# Patient Record
Sex: FEMALE | Race: WHITE | NOT HISPANIC OR LATINO | Employment: OTHER | URBAN - METROPOLITAN AREA
[De-identification: names, ages, dates, MRNs, and addresses within clinical notes are randomized per-mention and may not be internally consistent; named-entity substitution may affect disease eponyms.]

---

## 2019-07-27 ENCOUNTER — HOSPITAL ENCOUNTER (INPATIENT)
Facility: HOSPITAL | Age: 69
LOS: 5 days | Discharge: HOME HEALTH | DRG: 853 | End: 2019-08-01
Attending: EMERGENCY MEDICINE | Admitting: EMERGENCY MEDICINE
Payer: MEDICARE

## 2019-07-27 DIAGNOSIS — R00.0 TACHYCARDIA: ICD-10-CM

## 2019-07-27 DIAGNOSIS — N20.0 NEPHROLITHIASIS: ICD-10-CM

## 2019-07-27 DIAGNOSIS — A41.9 SEVERE SEPSIS WITH SEPTIC SHOCK: ICD-10-CM

## 2019-07-27 DIAGNOSIS — R51.9 NONINTRACTABLE HEADACHE, UNSPECIFIED CHRONICITY PATTERN, UNSPECIFIED HEADACHE TYPE: ICD-10-CM

## 2019-07-27 DIAGNOSIS — D70.8 OTHER NEUTROPENIA: ICD-10-CM

## 2019-07-27 DIAGNOSIS — I25.10 CAD (CORONARY ARTERY DISEASE): ICD-10-CM

## 2019-07-27 DIAGNOSIS — I21.4 NSTEMI (NON-ST ELEVATED MYOCARDIAL INFARCTION): ICD-10-CM

## 2019-07-27 DIAGNOSIS — Z45.2 ENCOUNTER FOR CENTRAL LINE PLACEMENT: ICD-10-CM

## 2019-07-27 DIAGNOSIS — R65.21 SEVERE SEPSIS WITH SEPTIC SHOCK: ICD-10-CM

## 2019-07-27 DIAGNOSIS — A41.9 SEPSIS, DUE TO UNSPECIFIED ORGANISM: Primary | ICD-10-CM

## 2019-07-27 DIAGNOSIS — R50.9 FEVER, UNSPECIFIED FEVER CAUSE: ICD-10-CM

## 2019-07-27 DIAGNOSIS — R07.9 CHEST PAIN: ICD-10-CM

## 2019-07-27 DIAGNOSIS — R79.89 ELEVATED TROPONIN: ICD-10-CM

## 2019-07-27 LAB
ALBUMIN SERPL BCP-MCNC: 3.4 G/DL (ref 3.5–5.2)
ALP SERPL-CCNC: 73 U/L (ref 55–135)
ALT SERPL W/O P-5'-P-CCNC: 25 U/L (ref 10–44)
AMPHET+METHAMPHET UR QL: NEGATIVE
ANION GAP SERPL CALC-SCNC: 14 MMOL/L (ref 8–16)
AST SERPL-CCNC: 20 U/L (ref 10–40)
BARBITURATES UR QL SCN>200 NG/ML: NEGATIVE
BASOPHILS NFR BLD: 0 % (ref 0–1.9)
BENZODIAZ UR QL SCN>200 NG/ML: NEGATIVE
BILIRUB SERPL-MCNC: 1.1 MG/DL (ref 0.1–1)
BILIRUB UR QL STRIP: NEGATIVE
BUN SERPL-MCNC: 27 MG/DL (ref 8–23)
BZE UR QL SCN: NEGATIVE
CALCIUM SERPL-MCNC: 9.6 MG/DL (ref 8.7–10.5)
CANNABINOIDS UR QL SCN: NEGATIVE
CHLORIDE SERPL-SCNC: 104 MMOL/L (ref 95–110)
CLARITY UR: CLEAR
CO2 SERPL-SCNC: 20 MMOL/L (ref 23–29)
COLOR UR: YELLOW
CREAT SERPL-MCNC: 1.8 MG/DL (ref 0.5–1.4)
CREAT UR-MCNC: 64.1 MG/DL (ref 15–325)
CTP QC/QA: YES
DIFFERENTIAL METHOD: ABNORMAL
EOSINOPHIL NFR BLD: 0 % (ref 0–8)
ERYTHROCYTE [DISTWIDTH] IN BLOOD BY AUTOMATED COUNT: 14.1 % (ref 11.5–14.5)
EST. GFR  (AFRICAN AMERICAN): 33 ML/MIN/1.73 M^2
EST. GFR  (NON AFRICAN AMERICAN): 29 ML/MIN/1.73 M^2
ETHANOL SERPL-MCNC: <10 MG/DL
FLUAV AG NPH QL: NEGATIVE
FLUBV AG NPH QL: NEGATIVE
GLUCOSE SERPL-MCNC: 118 MG/DL (ref 70–110)
GLUCOSE SERPL-MCNC: 165 MG/DL (ref 70–110)
GLUCOSE SERPL-MCNC: 177 MG/DL (ref 70–110)
GLUCOSE UR QL STRIP: ABNORMAL
HCT VFR BLD AUTO: 35.7 % (ref 37–48.5)
HGB BLD-MCNC: 11.3 G/DL (ref 12–16)
HGB UR QL STRIP: NEGATIVE
KETONES UR QL STRIP: NEGATIVE
LACTATE SERPL-SCNC: 4.3 MMOL/L (ref 0.5–2.2)
LACTATE SERPL-SCNC: 4.7 MMOL/L (ref 0.5–2.2)
LEUKOCYTE ESTERASE UR QL STRIP: NEGATIVE
LYMPHOCYTES NFR BLD: 40 % (ref 18–48)
MCH RBC QN AUTO: 29.7 PG (ref 27–31)
MCHC RBC AUTO-ENTMCNC: 31.7 G/DL (ref 32–36)
MCV RBC AUTO: 94 FL (ref 82–98)
METAMYELOCYTES NFR BLD MANUAL: 4 %
METHADONE UR QL SCN>300 NG/ML: NEGATIVE
MONOCYTES NFR BLD: 4 % (ref 4–15)
NEUTROPHILS NFR BLD: 44 % (ref 38–73)
NEUTS BAND NFR BLD MANUAL: 8 %
NITRITE UR QL STRIP: NEGATIVE
OPIATES UR QL SCN: NEGATIVE
PCP UR QL SCN>25 NG/ML: NEGATIVE
PH UR STRIP: 5 [PH] (ref 5–8)
PLATELET # BLD AUTO: 127 K/UL (ref 150–350)
PMV BLD AUTO: 9.8 FL (ref 9.2–12.9)
POTASSIUM SERPL-SCNC: 3.7 MMOL/L (ref 3.5–5.1)
PROT SERPL-MCNC: 6.3 G/DL (ref 6–8.4)
PROT UR QL STRIP: NEGATIVE
RBC # BLD AUTO: 3.8 M/UL (ref 4–5.4)
SODIUM SERPL-SCNC: 138 MMOL/L (ref 136–145)
SP GR UR STRIP: 1.01 (ref 1–1.03)
TOXICOLOGY INFORMATION: NORMAL
URN SPEC COLLECT METH UR: ABNORMAL
UROBILINOGEN UR STRIP-ACNC: NEGATIVE EU/DL
WBC # BLD AUTO: 1.07 K/UL (ref 3.9–12.7)

## 2019-07-27 PROCEDURE — 82962 GLUCOSE BLOOD TEST: CPT

## 2019-07-27 PROCEDURE — 80307 DRUG TEST PRSMV CHEM ANLYZR: CPT

## 2019-07-27 PROCEDURE — 96375 TX/PRO/DX INJ NEW DRUG ADDON: CPT

## 2019-07-27 PROCEDURE — 80053 COMPREHEN METABOLIC PANEL: CPT

## 2019-07-27 PROCEDURE — 93005 ELECTROCARDIOGRAM TRACING: CPT

## 2019-07-27 PROCEDURE — 85007 BL SMEAR W/DIFF WBC COUNT: CPT

## 2019-07-27 PROCEDURE — 84300 ASSAY OF URINE SODIUM: CPT

## 2019-07-27 PROCEDURE — 96367 TX/PROPH/DG ADDL SEQ IV INF: CPT

## 2019-07-27 PROCEDURE — 25000003 PHARM REV CODE 250: Performed by: EMERGENCY MEDICINE

## 2019-07-27 PROCEDURE — 85027 COMPLETE CBC AUTOMATED: CPT

## 2019-07-27 PROCEDURE — 82652 VIT D 1 25-DIHYDROXY: CPT

## 2019-07-27 PROCEDURE — 84484 ASSAY OF TROPONIN QUANT: CPT

## 2019-07-27 PROCEDURE — 81003 URINALYSIS AUTO W/O SCOPE: CPT | Mod: 59

## 2019-07-27 PROCEDURE — 87077 CULTURE AEROBIC IDENTIFY: CPT

## 2019-07-27 PROCEDURE — 93010 EKG 12-LEAD: ICD-10-PCS | Mod: ,,, | Performed by: INTERNAL MEDICINE

## 2019-07-27 PROCEDURE — 87186 SC STD MICRODIL/AGAR DIL: CPT

## 2019-07-27 PROCEDURE — 63600175 PHARM REV CODE 636 W HCPCS: Performed by: EMERGENCY MEDICINE

## 2019-07-27 PROCEDURE — 96361 HYDRATE IV INFUSION ADD-ON: CPT

## 2019-07-27 PROCEDURE — 93010 ELECTROCARDIOGRAM REPORT: CPT | Mod: ,,, | Performed by: INTERNAL MEDICINE

## 2019-07-27 PROCEDURE — 12000002 HC ACUTE/MED SURGE SEMI-PRIVATE ROOM

## 2019-07-27 PROCEDURE — 96365 THER/PROPH/DIAG IV INF INIT: CPT

## 2019-07-27 PROCEDURE — 80320 DRUG SCREEN QUANTALCOHOLS: CPT

## 2019-07-27 PROCEDURE — 36556 INSERT NON-TUNNEL CV CATH: CPT

## 2019-07-27 PROCEDURE — 83605 ASSAY OF LACTIC ACID: CPT | Mod: 91

## 2019-07-27 PROCEDURE — 87040 BLOOD CULTURE FOR BACTERIA: CPT

## 2019-07-27 PROCEDURE — 96366 THER/PROPH/DIAG IV INF ADDON: CPT

## 2019-07-27 PROCEDURE — 87804 INFLUENZA ASSAY W/OPTIC: CPT | Mod: 59

## 2019-07-27 PROCEDURE — 83970 ASSAY OF PARATHORMONE: CPT

## 2019-07-27 PROCEDURE — 99291 CRITICAL CARE FIRST HOUR: CPT | Mod: 25

## 2019-07-27 RX ORDER — NOREPINEPHRINE BITARTRATE/D5W 4MG/250ML
0.05 PLASTIC BAG, INJECTION (ML) INTRAVENOUS CONTINUOUS
Status: DISCONTINUED | OUTPATIENT
Start: 2019-07-27 | End: 2019-07-28

## 2019-07-27 RX ORDER — LIDOCAINE HYDROCHLORIDE 10 MG/ML
5 INJECTION INFILTRATION; PERINEURAL
Status: DISCONTINUED | OUTPATIENT
Start: 2019-07-27 | End: 2019-07-28

## 2019-07-27 RX ORDER — ACETAMINOPHEN 500 MG
1000 TABLET ORAL
Status: COMPLETED | OUTPATIENT
Start: 2019-07-27 | End: 2019-07-27

## 2019-07-27 RX ORDER — MORPHINE SULFATE 10 MG/ML
2 INJECTION INTRAMUSCULAR; INTRAVENOUS; SUBCUTANEOUS
Status: COMPLETED | OUTPATIENT
Start: 2019-07-27 | End: 2019-07-27

## 2019-07-27 RX ORDER — ONDANSETRON 2 MG/ML
4 INJECTION INTRAMUSCULAR; INTRAVENOUS
Status: COMPLETED | OUTPATIENT
Start: 2019-07-27 | End: 2019-07-27

## 2019-07-27 RX ADMIN — PIPERACILLIN AND TAZOBACTAM 4.5 G: 4; .5 INJECTION, POWDER, LYOPHILIZED, FOR SOLUTION INTRAVENOUS; PARENTERAL at 07:07

## 2019-07-27 RX ADMIN — VANCOMYCIN HYDROCHLORIDE 2000 MG: 100 INJECTION, POWDER, LYOPHILIZED, FOR SOLUTION INTRAVENOUS at 07:07

## 2019-07-27 RX ADMIN — SODIUM CHLORIDE 1000 ML: 0.9 INJECTION, SOLUTION INTRAVENOUS at 09:07

## 2019-07-27 RX ADMIN — SODIUM CHLORIDE 1500 ML: 0.9 INJECTION, SOLUTION INTRAVENOUS at 07:07

## 2019-07-27 RX ADMIN — ONDANSETRON 4 MG: 2 INJECTION INTRAMUSCULAR; INTRAVENOUS at 08:07

## 2019-07-27 RX ADMIN — AMPICILLIN SODIUM 2 G: 2 INJECTION, POWDER, FOR SOLUTION INTRAMUSCULAR; INTRAVENOUS at 11:07

## 2019-07-27 RX ADMIN — Medication 0.05 MCG/KG/MIN: at 09:07

## 2019-07-27 RX ADMIN — ACETAMINOPHEN 1000 MG: 500 TABLET ORAL at 07:07

## 2019-07-27 RX ADMIN — MORPHINE SULFATE 2 MG: 10 INJECTION INTRAVENOUS at 10:07

## 2019-07-27 NOTE — Clinical Note
127 ml injected throughout the case. 39 mL total wasted during the case. 166 mL total used in the case.

## 2019-07-27 NOTE — Clinical Note
Catheter is inserted into the ostium   right coronary artery. Angiography performed of the right coronary arteries in multiple views. Angiography performed via power injection with .

## 2019-07-27 NOTE — Clinical Note
Catheter is repositioned to the and insertion attempt made into the ostium   right coronary artery.

## 2019-07-28 ENCOUNTER — ANESTHESIA (OUTPATIENT)
Dept: SURGERY | Facility: HOSPITAL | Age: 69
DRG: 853 | End: 2019-07-28
Payer: MEDICARE

## 2019-07-28 ENCOUNTER — ANESTHESIA EVENT (OUTPATIENT)
Dept: SURGERY | Facility: HOSPITAL | Age: 69
DRG: 853 | End: 2019-07-28
Payer: MEDICARE

## 2019-07-28 PROBLEM — I10 ESSENTIAL HYPERTENSION: Chronic | Status: ACTIVE | Noted: 2019-07-28

## 2019-07-28 PROBLEM — D63.8 ANEMIA OF CHRONIC DISEASE: Chronic | Status: ACTIVE | Noted: 2019-07-28

## 2019-07-28 PROBLEM — R79.89 ELEVATED TROPONIN: Status: ACTIVE | Noted: 2019-07-28

## 2019-07-28 PROBLEM — E87.20 METABOLIC ACIDOSIS: Status: ACTIVE | Noted: 2019-07-28

## 2019-07-28 PROBLEM — N17.9 ACUTE RENAL FAILURE: Status: ACTIVE | Noted: 2019-07-28

## 2019-07-28 PROBLEM — R65.21 SEVERE SEPSIS WITH SEPTIC SHOCK: Status: ACTIVE | Noted: 2019-07-27

## 2019-07-28 PROBLEM — N11.1 OBSTRUCTIVE PYELONEPHRITIS: Status: ACTIVE | Noted: 2019-07-28

## 2019-07-28 PROBLEM — I25.10 CORONARY ARTERY DISEASE: Chronic | Status: ACTIVE | Noted: 2019-07-28

## 2019-07-28 PROBLEM — J96.90 RESPIRATORY FAILURE: Status: ACTIVE | Noted: 2019-07-28

## 2019-07-28 PROBLEM — D63.8 ANEMIA OF CHRONIC DISEASE: Status: ACTIVE | Noted: 2019-07-28

## 2019-07-28 PROBLEM — E11.9 TYPE 2 DIABETES MELLITUS: Chronic | Status: ACTIVE | Noted: 2019-07-28

## 2019-07-28 PROBLEM — I10 ESSENTIAL HYPERTENSION: Status: ACTIVE | Noted: 2019-07-28

## 2019-07-28 PROBLEM — I25.10 CORONARY ARTERY DISEASE: Status: ACTIVE | Noted: 2019-07-28

## 2019-07-28 PROBLEM — E11.9 TYPE 2 DIABETES MELLITUS: Status: ACTIVE | Noted: 2019-07-28

## 2019-07-28 LAB
ALBUMIN SERPL BCP-MCNC: 2.7 G/DL (ref 3.5–5.2)
ALBUMIN SERPL BCP-MCNC: 2.9 G/DL (ref 3.5–5.2)
ALLENS TEST: ABNORMAL
ALLENS TEST: ABNORMAL
ALP SERPL-CCNC: 43 U/L (ref 55–135)
ALP SERPL-CCNC: 50 U/L (ref 55–135)
ALT SERPL W/O P-5'-P-CCNC: 18 U/L (ref 10–44)
ALT SERPL W/O P-5'-P-CCNC: 24 U/L (ref 10–44)
AMORPH CRY URNS QL MICRO: ABNORMAL
ANION GAP SERPL CALC-SCNC: 17 MMOL/L (ref 8–16)
ANION GAP SERPL CALC-SCNC: 9 MMOL/L (ref 8–16)
AORTIC ROOT ANNULUS: 2.39 CM
AORTIC VALVE CUSP SEPERATION: 1.34 CM
ASCENDING AORTA: 2.46 CM
AST SERPL-CCNC: 24 U/L (ref 10–40)
AST SERPL-CCNC: 32 U/L (ref 10–40)
AV INDEX (PROSTH): 0.49
AV MEAN GRADIENT: 7 MMHG
AV PEAK GRADIENT: 12 MMHG
AV VALVE AREA: 1.52 CM2
AV VELOCITY RATIO: 0.51
BACTERIA #/AREA URNS HPF: ABNORMAL /HPF
BASOPHILS # BLD AUTO: 0.02 K/UL (ref 0–0.2)
BASOPHILS NFR BLD: 0 % (ref 0–1.9)
BASOPHILS NFR BLD: 0.1 % (ref 0–1.9)
BILIRUB SERPL-MCNC: 0.7 MG/DL (ref 0.1–1)
BILIRUB SERPL-MCNC: 0.8 MG/DL (ref 0.1–1)
BILIRUB UR QL STRIP: NEGATIVE
BSA FOR ECHO PROCEDURE: 1.9 M2
BUN SERPL-MCNC: 29 MG/DL (ref 8–23)
BUN SERPL-MCNC: 30 MG/DL (ref 8–23)
CALCIUM SERPL-MCNC: 8.2 MG/DL (ref 8.7–10.5)
CALCIUM SERPL-MCNC: 8.2 MG/DL (ref 8.7–10.5)
CHLORIDE SERPL-SCNC: 105 MMOL/L (ref 95–110)
CHLORIDE SERPL-SCNC: 107 MMOL/L (ref 95–110)
CLARITY UR: ABNORMAL
CO2 SERPL-SCNC: 13 MMOL/L (ref 23–29)
CO2 SERPL-SCNC: 21 MMOL/L (ref 23–29)
COLOR UR: ABNORMAL
CREAT SERPL-MCNC: 2.2 MG/DL (ref 0.5–1.4)
CREAT SERPL-MCNC: 2.4 MG/DL (ref 0.5–1.4)
CV ECHO LV RWT: 0.49 CM
DELSYS: ABNORMAL
DELSYS: ABNORMAL
DIFFERENTIAL METHOD: ABNORMAL
DIFFERENTIAL METHOD: ABNORMAL
DOP CALC AO PEAK VEL: 1.71 M/S
DOP CALC AO VTI: 36.37 CM
DOP CALC LVOT AREA: 3.1 CM2
DOP CALC LVOT DIAMETER: 2 CM
DOP CALC LVOT PEAK VEL: 0.87 M/S
DOP CALC LVOT STROKE VOLUME: 55.39 CM3
DOP CALCLVOT PEAK VEL VTI: 17.64 CM
E WAVE DECELERATION TIME: 194.99 MSEC
E/A RATIO: 1.37
E/E' RATIO: 13.18 M/S
ECHO LV POSTERIOR WALL: 0.95 CM (ref 0.6–1.1)
EOSINOPHIL # BLD AUTO: 0 K/UL (ref 0–0.5)
EOSINOPHIL NFR BLD: 0 % (ref 0–8)
EOSINOPHIL NFR BLD: 0 % (ref 0–8)
ERYTHROCYTE [DISTWIDTH] IN BLOOD BY AUTOMATED COUNT: 14.3 % (ref 11.5–14.5)
ERYTHROCYTE [DISTWIDTH] IN BLOOD BY AUTOMATED COUNT: 14.5 % (ref 11.5–14.5)
ERYTHROCYTE [SEDIMENTATION RATE] IN BLOOD BY WESTERGREN METHOD: 12 MM/H
ERYTHROCYTE [SEDIMENTATION RATE] IN BLOOD BY WESTERGREN METHOD: 20 MM/H
EST. GFR  (AFRICAN AMERICAN): 23 ML/MIN/1.73 M^2
EST. GFR  (AFRICAN AMERICAN): 26 ML/MIN/1.73 M^2
EST. GFR  (NON AFRICAN AMERICAN): 20 ML/MIN/1.73 M^2
EST. GFR  (NON AFRICAN AMERICAN): 22 ML/MIN/1.73 M^2
ESTIMATED AVG GLUCOSE: 194 MG/DL (ref 68–131)
FIO2: 50
FIO2: 60
FRACTIONAL SHORTENING: 31 % (ref 28–44)
GLUCOSE SERPL-MCNC: 226 MG/DL (ref 70–110)
GLUCOSE SERPL-MCNC: 261 MG/DL (ref 70–110)
GLUCOSE UR QL STRIP: ABNORMAL
HBA1C MFR BLD HPLC: 8.4 % (ref 4–5.6)
HCO3 UR-SCNC: 14.5 MMOL/L (ref 24–28)
HCO3 UR-SCNC: 16.3 MMOL/L (ref 24–28)
HCT VFR BLD AUTO: 30.5 % (ref 37–48.5)
HCT VFR BLD AUTO: 32.4 % (ref 37–48.5)
HGB BLD-MCNC: 10.1 G/DL (ref 12–16)
HGB BLD-MCNC: 9.6 G/DL (ref 12–16)
HGB UR QL STRIP: ABNORMAL
HYALINE CASTS #/AREA URNS LPF: 0 /LPF
INTERVENTRICULAR SEPTUM: 0.92 CM (ref 0.6–1.1)
IVRT: 0.07 MSEC
KETONES UR QL STRIP: NEGATIVE
LA MAJOR: 4.78 CM
LA MINOR: 4.75 CM
LA WIDTH: 3.28 CM
LACTATE SERPL-SCNC: 4.3 MMOL/L (ref 0.5–2.2)
LEFT ATRIUM SIZE: 3.56 CM
LEFT ATRIUM VOLUME INDEX: 25.9 ML/M2
LEFT ATRIUM VOLUME: 47.29 CM3
LEFT INTERNAL DIMENSION IN SYSTOLE: 2.7 CM (ref 2.1–4)
LEFT VENTRICLE DIASTOLIC VOLUME INDEX: 36.2 ML/M2
LEFT VENTRICLE DIASTOLIC VOLUME: 66.09 ML
LEFT VENTRICLE MASS INDEX: 61 G/M2
LEFT VENTRICLE SYSTOLIC VOLUME INDEX: 14.8 ML/M2
LEFT VENTRICLE SYSTOLIC VOLUME: 27 ML
LEFT VENTRICULAR INTERNAL DIMENSION IN DIASTOLE: 3.9 CM (ref 3.5–6)
LEFT VENTRICULAR MASS: 111.07 G
LEUKOCYTE ESTERASE UR QL STRIP: ABNORMAL
LV LATERAL E/E' RATIO: 11.2 M/S
LV SEPTAL E/E' RATIO: 16 M/S
LYMPHOCYTES # BLD AUTO: 1.6 K/UL (ref 1–4.8)
LYMPHOCYTES NFR BLD: 7.2 % (ref 18–48)
LYMPHOCYTES NFR BLD: 9 % (ref 18–48)
MAGNESIUM SERPL-MCNC: 0.9 MG/DL (ref 1.6–2.6)
MCH RBC QN AUTO: 29.7 PG (ref 27–31)
MCH RBC QN AUTO: 29.8 PG (ref 27–31)
MCHC RBC AUTO-ENTMCNC: 31.2 G/DL (ref 32–36)
MCHC RBC AUTO-ENTMCNC: 31.5 G/DL (ref 32–36)
MCV RBC AUTO: 94 FL (ref 82–98)
MCV RBC AUTO: 96 FL (ref 82–98)
METAMYELOCYTES NFR BLD MANUAL: 10 %
MICROSCOPIC COMMENT: ABNORMAL
MODE: ABNORMAL
MODE: ABNORMAL
MONOCYTES # BLD AUTO: 1.2 K/UL (ref 0.3–1)
MONOCYTES NFR BLD: 1 % (ref 4–15)
MONOCYTES NFR BLD: 5.6 % (ref 4–15)
MV PEAK A VEL: 0.82 M/S
MV PEAK E VEL: 1.12 M/S
MYELOCYTES NFR BLD MANUAL: 4 %
NEUTROPHILS # BLD AUTO: 19 K/UL (ref 1.8–7.7)
NEUTROPHILS NFR BLD: 54 % (ref 38–73)
NEUTROPHILS NFR BLD: 89.2 % (ref 38–73)
NEUTS BAND NFR BLD MANUAL: 22 %
NITRITE UR QL STRIP: NEGATIVE
PCO2 BLDA: 32.7 MMHG (ref 35–45)
PCO2 BLDA: 54.9 MMHG (ref 35–45)
PEEP: 5
PEEP: 5
PH SMN: 7.08 [PH] (ref 7.35–7.45)
PH SMN: 7.25 [PH] (ref 7.35–7.45)
PH UR STRIP: 7 [PH] (ref 5–8)
PHOSPHATE SERPL-MCNC: 2.4 MG/DL (ref 2.7–4.5)
PISA TR MAX VEL: 1.49 M/S
PLATELET # BLD AUTO: 139 K/UL (ref 150–350)
PLATELET # BLD AUTO: 140 K/UL (ref 150–350)
PMV BLD AUTO: 10.5 FL (ref 9.2–12.9)
PMV BLD AUTO: 11.3 FL (ref 9.2–12.9)
PO2 BLDA: 109 MMHG (ref 80–100)
PO2 BLDA: 110 MMHG (ref 80–100)
POC BE: -12 MMOL/L
POC BE: -13 MMOL/L
POC SATURATED O2: 96 % (ref 95–100)
POC SATURATED O2: 97 % (ref 95–100)
POC TCO2: 15 MMOL/L (ref 23–27)
POC TCO2: 18 MMOL/L (ref 23–27)
POCT GLUCOSE: 118 MG/DL (ref 70–110)
POCT GLUCOSE: 177 MG/DL (ref 70–110)
POCT GLUCOSE: 215 MG/DL (ref 70–110)
POCT GLUCOSE: 269 MG/DL (ref 70–110)
POCT GLUCOSE: 272 MG/DL (ref 70–110)
POCT GLUCOSE: 301 MG/DL (ref 70–110)
POTASSIUM SERPL-SCNC: 3.9 MMOL/L (ref 3.5–5.1)
POTASSIUM SERPL-SCNC: 4.3 MMOL/L (ref 3.5–5.1)
PROT SERPL-MCNC: 5.2 G/DL (ref 6–8.4)
PROT SERPL-MCNC: 6.2 G/DL (ref 6–8.4)
PROT UR QL STRIP: ABNORMAL
PULM VEIN S/D RATIO: 1.46
PV PEAK D VEL: 0.35 M/S
PV PEAK S VEL: 0.51 M/S
PV PEAK VELOCITY: 0.71 CM/S
RA MAJOR: 3.87 CM
RA PRESSURE: 3 MMHG
RA WIDTH: 3.64 CM
RBC # BLD AUTO: 3.23 M/UL (ref 4–5.4)
RBC # BLD AUTO: 3.39 M/UL (ref 4–5.4)
RBC #/AREA URNS HPF: >100 /HPF (ref 0–4)
RIGHT VENTRICULAR END-DIASTOLIC DIMENSION: 2.87 CM
RV TISSUE DOPPLER FREE WALL SYSTOLIC VELOCITY 1 (APICAL 4 CHAMBER VIEW): 7.91 CM/S
SAMPLE: ABNORMAL
SAMPLE: ABNORMAL
SINUS: 2.53 CM
SITE: ABNORMAL
SITE: ABNORMAL
SODIUM SERPL-SCNC: 135 MMOL/L (ref 136–145)
SODIUM SERPL-SCNC: 137 MMOL/L (ref 136–145)
SODIUM UR-SCNC: 114 MMOL/L (ref 20–250)
SP GR UR STRIP: 1.02 (ref 1–1.03)
SP02: 100
STJ: 2.3 CM
TDI LATERAL: 0.1 M/S
TDI SEPTAL: 0.07 M/S
TDI: 0.09 M/S
TR MAX PG: 9 MMHG
TRICUSPID ANNULAR PLANE SYSTOLIC EXCURSION: 2.35 CM
TROPONIN I SERPL DL<=0.01 NG/ML-MCNC: 0.08 NG/ML (ref 0–0.03)
TROPONIN I SERPL DL<=0.01 NG/ML-MCNC: 0.74 NG/ML (ref 0–0.03)
TROPONIN I SERPL DL<=0.01 NG/ML-MCNC: 1.03 NG/ML (ref 0–0.03)
TROPONIN I SERPL DL<=0.01 NG/ML-MCNC: 1.09 NG/ML (ref 0–0.03)
TV REST PULMONARY ARTERY PRESSURE: 12 MMHG
URN SPEC COLLECT METH UR: ABNORMAL
UROBILINOGEN UR STRIP-ACNC: NEGATIVE EU/DL
VT: 430
VT: 450
WBC # BLD AUTO: 12.09 K/UL (ref 3.9–12.7)
WBC # BLD AUTO: 21.83 K/UL (ref 3.9–12.7)
WBC #/AREA URNS HPF: 45 /HPF (ref 0–5)

## 2019-07-28 PROCEDURE — 83605 ASSAY OF LACTIC ACID: CPT

## 2019-07-28 PROCEDURE — 36620 PR INSERT CATH,ART,PERCUT,SHORTTERM: ICD-10-PCS | Mod: 59,,, | Performed by: ANESTHESIOLOGY

## 2019-07-28 PROCEDURE — 37000009 HC ANESTHESIA EA ADD 15 MINS: Performed by: UROLOGY

## 2019-07-28 PROCEDURE — 99223 1ST HOSP IP/OBS HIGH 75: CPT | Mod: ,,, | Performed by: UROLOGY

## 2019-07-28 PROCEDURE — 80048 BASIC METABOLIC PNL TOTAL CA: CPT

## 2019-07-28 PROCEDURE — D9220A PRA ANESTHESIA: Mod: CRNA,,, | Performed by: REGISTERED NURSE

## 2019-07-28 PROCEDURE — 25000003 PHARM REV CODE 250: Performed by: INTERNAL MEDICINE

## 2019-07-28 PROCEDURE — 76000 FLUOROSCOPY <1 HR PHYS/QHP: CPT | Mod: 26,59,, | Performed by: UROLOGY

## 2019-07-28 PROCEDURE — C1758 CATHETER, URETERAL: HCPCS | Performed by: UROLOGY

## 2019-07-28 PROCEDURE — 63600175 PHARM REV CODE 636 W HCPCS: Performed by: INTERNAL MEDICINE

## 2019-07-28 PROCEDURE — 82803 BLOOD GASES ANY COMBINATION: CPT

## 2019-07-28 PROCEDURE — 36415 COLL VENOUS BLD VENIPUNCTURE: CPT

## 2019-07-28 PROCEDURE — D9220A PRA ANESTHESIA: ICD-10-PCS | Mod: ANES,,, | Performed by: ANESTHESIOLOGY

## 2019-07-28 PROCEDURE — 63600175 PHARM REV CODE 636 W HCPCS: Performed by: HOSPITALIST

## 2019-07-28 PROCEDURE — 85027 COMPLETE CBC AUTOMATED: CPT

## 2019-07-28 PROCEDURE — 27000221 HC OXYGEN, UP TO 24 HOURS

## 2019-07-28 PROCEDURE — 94002 VENT MGMT INPAT INIT DAY: CPT

## 2019-07-28 PROCEDURE — 80053 COMPREHEN METABOLIC PANEL: CPT | Mod: 91

## 2019-07-28 PROCEDURE — 99291 CRITICAL CARE FIRST HOUR: CPT | Mod: ,,, | Performed by: INTERNAL MEDICINE

## 2019-07-28 PROCEDURE — 99900026 HC AIRWAY MAINTENANCE (STAT)

## 2019-07-28 PROCEDURE — D9220A PRA ANESTHESIA: ICD-10-PCS | Mod: CRNA,,, | Performed by: REGISTERED NURSE

## 2019-07-28 PROCEDURE — 83036 HEMOGLOBIN GLYCOSYLATED A1C: CPT

## 2019-07-28 PROCEDURE — S0028 INJECTION, FAMOTIDINE, 20 MG: HCPCS | Performed by: HOSPITALIST

## 2019-07-28 PROCEDURE — 63600175 PHARM REV CODE 636 W HCPCS: Performed by: REGISTERED NURSE

## 2019-07-28 PROCEDURE — 99291 CRITICAL CARE FIRST HOUR: CPT | Mod: 25,,, | Performed by: INTERNAL MEDICINE

## 2019-07-28 PROCEDURE — 36000707: Performed by: UROLOGY

## 2019-07-28 PROCEDURE — D9220A PRA ANESTHESIA: Mod: ANES,,, | Performed by: ANESTHESIOLOGY

## 2019-07-28 PROCEDURE — 37799 UNLISTED PX VASCULAR SURGERY: CPT

## 2019-07-28 PROCEDURE — 87077 CULTURE AEROBIC IDENTIFY: CPT

## 2019-07-28 PROCEDURE — 25000003 PHARM REV CODE 250: Performed by: HOSPITALIST

## 2019-07-28 PROCEDURE — 83735 ASSAY OF MAGNESIUM: CPT

## 2019-07-28 PROCEDURE — 63600175 PHARM REV CODE 636 W HCPCS

## 2019-07-28 PROCEDURE — 25000003 PHARM REV CODE 250: Performed by: REGISTERED NURSE

## 2019-07-28 PROCEDURE — 87086 URINE CULTURE/COLONY COUNT: CPT

## 2019-07-28 PROCEDURE — 52332 PR CYSTOSCOPY,INSERT URETERAL STENT: ICD-10-PCS | Mod: RT,,, | Performed by: UROLOGY

## 2019-07-28 PROCEDURE — 87088 URINE BACTERIA CULTURE: CPT

## 2019-07-28 PROCEDURE — 36000706: Performed by: UROLOGY

## 2019-07-28 PROCEDURE — 63600175 PHARM REV CODE 636 W HCPCS: Performed by: UROLOGY

## 2019-07-28 PROCEDURE — 85007 BL SMEAR W/DIFF WBC COUNT: CPT

## 2019-07-28 PROCEDURE — 52332 CYSTOSCOPY AND TREATMENT: CPT | Mod: RT,,, | Performed by: UROLOGY

## 2019-07-28 PROCEDURE — 84484 ASSAY OF TROPONIN QUANT: CPT | Mod: 91

## 2019-07-28 PROCEDURE — 99223 PR INITIAL HOSPITAL CARE,LEVL III: ICD-10-PCS | Mod: ,,, | Performed by: UROLOGY

## 2019-07-28 PROCEDURE — 94761 N-INVAS EAR/PLS OXIMETRY MLT: CPT

## 2019-07-28 PROCEDURE — 87186 SC STD MICRODIL/AGAR DIL: CPT

## 2019-07-28 PROCEDURE — 84100 ASSAY OF PHOSPHORUS: CPT

## 2019-07-28 PROCEDURE — 99900035 HC TECH TIME PER 15 MIN (STAT)

## 2019-07-28 PROCEDURE — 63600175 PHARM REV CODE 636 W HCPCS: Performed by: EMERGENCY MEDICINE

## 2019-07-28 PROCEDURE — 80053 COMPREHEN METABOLIC PANEL: CPT

## 2019-07-28 PROCEDURE — 81000 URINALYSIS NONAUTO W/SCOPE: CPT

## 2019-07-28 PROCEDURE — 85025 COMPLETE CBC W/AUTO DIFF WBC: CPT

## 2019-07-28 PROCEDURE — 27200966 HC CLOSED SUCTION SYSTEM

## 2019-07-28 PROCEDURE — 99291 PR CRITICAL CARE, E/M 30-74 MINUTES: ICD-10-PCS | Mod: 25,,, | Performed by: INTERNAL MEDICINE

## 2019-07-28 PROCEDURE — 99291 PR CRITICAL CARE, E/M 30-74 MINUTES: ICD-10-PCS | Mod: ,,, | Performed by: INTERNAL MEDICINE

## 2019-07-28 PROCEDURE — C2617 STENT, NON-COR, TEM W/O DEL: HCPCS | Performed by: UROLOGY

## 2019-07-28 PROCEDURE — C1769 GUIDE WIRE: HCPCS | Performed by: UROLOGY

## 2019-07-28 PROCEDURE — 36620 INSERTION CATHETER ARTERY: CPT | Mod: 59,,, | Performed by: ANESTHESIOLOGY

## 2019-07-28 PROCEDURE — 37000008 HC ANESTHESIA 1ST 15 MINUTES: Performed by: UROLOGY

## 2019-07-28 PROCEDURE — 20000000 HC ICU ROOM

## 2019-07-28 PROCEDURE — 76000 PR  FLUOROSCOPE EXAMINATION: ICD-10-PCS | Mod: 26,59,, | Performed by: UROLOGY

## 2019-07-28 DEVICE — STENT URET PERCUFLEX 6FR 26CM: Type: IMPLANTABLE DEVICE | Site: URETER | Status: FUNCTIONAL

## 2019-07-28 RX ORDER — SUCCINYLCHOLINE CHLORIDE 20 MG/ML
INJECTION INTRAMUSCULAR; INTRAVENOUS
Status: DISCONTINUED | OUTPATIENT
Start: 2019-07-28 | End: 2019-07-28

## 2019-07-28 RX ORDER — SODIUM,POTASSIUM PHOSPHATES 280-250MG
1 POWDER IN PACKET (EA) ORAL
Status: DISCONTINUED | OUTPATIENT
Start: 2019-07-28 | End: 2019-07-28

## 2019-07-28 RX ORDER — FAMOTIDINE 10 MG/ML
20 INJECTION INTRAVENOUS DAILY
Status: DISCONTINUED | OUTPATIENT
Start: 2019-07-28 | End: 2019-08-01 | Stop reason: HOSPADM

## 2019-07-28 RX ORDER — ACETAMINOPHEN 325 MG/1
650 TABLET ORAL EVERY 8 HOURS PRN
Status: DISCONTINUED | OUTPATIENT
Start: 2019-07-28 | End: 2019-07-28

## 2019-07-28 RX ORDER — METOPROLOL TARTRATE 1 MG/ML
INJECTION, SOLUTION INTRAVENOUS
Status: DISCONTINUED | OUTPATIENT
Start: 2019-07-28 | End: 2019-07-28

## 2019-07-28 RX ORDER — DEXMEDETOMIDINE HYDROCHLORIDE 4 UG/ML
0.2 INJECTION INTRAVENOUS CONTINUOUS
Status: DISCONTINUED | OUTPATIENT
Start: 2019-07-28 | End: 2019-07-28

## 2019-07-28 RX ORDER — SODIUM CHLORIDE 0.9 % (FLUSH) 0.9 %
10 SYRINGE (ML) INJECTION
Status: DISCONTINUED | OUTPATIENT
Start: 2019-07-28 | End: 2019-07-28

## 2019-07-28 RX ORDER — PROPOFOL 10 MG/ML
INJECTION, EMULSION INTRAVENOUS
Status: COMPLETED
Start: 2019-07-28 | End: 2019-07-28

## 2019-07-28 RX ORDER — INSULIN ASPART 100 [IU]/ML
0-5 INJECTION, SOLUTION INTRAVENOUS; SUBCUTANEOUS
Status: DISCONTINUED | OUTPATIENT
Start: 2019-07-28 | End: 2019-07-29

## 2019-07-28 RX ORDER — INSULIN ASPART 100 [IU]/ML
3 INJECTION, SOLUTION INTRAVENOUS; SUBCUTANEOUS
Status: DISCONTINUED | OUTPATIENT
Start: 2019-07-28 | End: 2019-07-28

## 2019-07-28 RX ORDER — PROPOFOL 10 MG/ML
5 INJECTION, EMULSION INTRAVENOUS CONTINUOUS
Status: DISCONTINUED | OUTPATIENT
Start: 2019-07-28 | End: 2019-07-29

## 2019-07-28 RX ORDER — ENOXAPARIN SODIUM 100 MG/ML
30 INJECTION SUBCUTANEOUS EVERY 24 HOURS
Status: DISCONTINUED | OUTPATIENT
Start: 2019-07-28 | End: 2019-07-28

## 2019-07-28 RX ORDER — IBUPROFEN 200 MG
24 TABLET ORAL
Status: DISCONTINUED | OUTPATIENT
Start: 2019-07-28 | End: 2019-08-01 | Stop reason: HOSPADM

## 2019-07-28 RX ORDER — ROCURONIUM BROMIDE 10 MG/ML
INJECTION, SOLUTION INTRAVENOUS
Status: DISCONTINUED | OUTPATIENT
Start: 2019-07-28 | End: 2019-07-28

## 2019-07-28 RX ORDER — IBUPROFEN 200 MG
16 TABLET ORAL
Status: DISCONTINUED | OUTPATIENT
Start: 2019-07-28 | End: 2019-08-01 | Stop reason: HOSPADM

## 2019-07-28 RX ORDER — PROPOFOL 10 MG/ML
VIAL (ML) INTRAVENOUS
Status: DISCONTINUED | OUTPATIENT
Start: 2019-07-28 | End: 2019-07-28

## 2019-07-28 RX ORDER — GLUCAGON 1 MG
1 KIT INJECTION
Status: DISCONTINUED | OUTPATIENT
Start: 2019-07-28 | End: 2019-08-01 | Stop reason: HOSPADM

## 2019-07-28 RX ORDER — CHLORHEXIDINE GLUCONATE ORAL RINSE 1.2 MG/ML
15 SOLUTION DENTAL 2 TIMES DAILY
Status: DISCONTINUED | OUTPATIENT
Start: 2019-07-28 | End: 2019-07-29

## 2019-07-28 RX ORDER — ONDANSETRON 2 MG/ML
4 INJECTION INTRAMUSCULAR; INTRAVENOUS EVERY 8 HOURS PRN
Status: DISCONTINUED | OUTPATIENT
Start: 2019-07-28 | End: 2019-07-28

## 2019-07-28 RX ORDER — MORPHINE SULFATE 10 MG/ML
2 INJECTION INTRAMUSCULAR; INTRAVENOUS; SUBCUTANEOUS EVERY 4 HOURS PRN
Status: DISCONTINUED | OUTPATIENT
Start: 2019-07-28 | End: 2019-07-28

## 2019-07-28 RX ORDER — AMOXICILLIN 250 MG
1 CAPSULE ORAL 2 TIMES DAILY PRN
Status: DISCONTINUED | OUTPATIENT
Start: 2019-07-28 | End: 2019-08-01 | Stop reason: HOSPADM

## 2019-07-28 RX ORDER — SODIUM CHLORIDE 9 MG/ML
INJECTION, SOLUTION INTRAVENOUS CONTINUOUS
Status: DISCONTINUED | OUTPATIENT
Start: 2019-07-28 | End: 2019-07-28

## 2019-07-28 RX ORDER — FENTANYL CITRATE 50 UG/ML
INJECTION, SOLUTION INTRAMUSCULAR; INTRAVENOUS
Status: DISCONTINUED | OUTPATIENT
Start: 2019-07-28 | End: 2019-07-28

## 2019-07-28 RX ORDER — ENOXAPARIN SODIUM 100 MG/ML
30 INJECTION SUBCUTANEOUS EVERY 24 HOURS
Status: DISCONTINUED | OUTPATIENT
Start: 2019-07-29 | End: 2019-07-29

## 2019-07-28 RX ORDER — ONDANSETRON 2 MG/ML
8 INJECTION INTRAMUSCULAR; INTRAVENOUS EVERY 8 HOURS PRN
Status: DISCONTINUED | OUTPATIENT
Start: 2019-07-28 | End: 2019-08-01 | Stop reason: HOSPADM

## 2019-07-28 RX ORDER — LIDOCAINE HCL/PF 100 MG/5ML
SYRINGE (ML) INTRAVENOUS
Status: DISCONTINUED | OUTPATIENT
Start: 2019-07-28 | End: 2019-07-28

## 2019-07-28 RX ORDER — ACETAMINOPHEN 500 MG
500 TABLET ORAL EVERY 6 HOURS PRN
Status: DISCONTINUED | OUTPATIENT
Start: 2019-07-28 | End: 2019-07-29

## 2019-07-28 RX ORDER — TRAMADOL HYDROCHLORIDE 50 MG/1
50 TABLET ORAL EVERY 8 HOURS PRN
Status: DISCONTINUED | OUTPATIENT
Start: 2019-07-28 | End: 2019-07-29

## 2019-07-28 RX ORDER — DEXMEDETOMIDINE HYDROCHLORIDE 4 UG/ML
0.2 INJECTION INTRAVENOUS CONTINUOUS
Status: DISCONTINUED | OUTPATIENT
Start: 2019-07-28 | End: 2019-07-30

## 2019-07-28 RX ORDER — FENTANYL CITRATE 50 UG/ML
12.5 INJECTION, SOLUTION INTRAMUSCULAR; INTRAVENOUS
Status: DISCONTINUED | OUTPATIENT
Start: 2019-07-28 | End: 2019-07-29

## 2019-07-28 RX ORDER — ETOMIDATE 2 MG/ML
INJECTION INTRAVENOUS
Status: DISCONTINUED | OUTPATIENT
Start: 2019-07-28 | End: 2019-07-28

## 2019-07-28 RX ORDER — NOREPINEPHRINE BITARTRATE/D5W 4MG/250ML
0.05 PLASTIC BAG, INJECTION (ML) INTRAVENOUS CONTINUOUS
Status: DISCONTINUED | OUTPATIENT
Start: 2019-07-28 | End: 2019-07-28

## 2019-07-28 RX ORDER — RAMELTEON 8 MG/1
8 TABLET ORAL NIGHTLY PRN
Status: DISCONTINUED | OUTPATIENT
Start: 2019-07-28 | End: 2019-08-01 | Stop reason: HOSPADM

## 2019-07-28 RX ORDER — PHENYLEPHRINE HYDROCHLORIDE 10 MG/ML
INJECTION INTRAVENOUS
Status: DISCONTINUED | OUTPATIENT
Start: 2019-07-28 | End: 2019-07-28

## 2019-07-28 RX ADMIN — ACETAMINOPHEN 500 MG: 500 TABLET, FILM COATED ORAL at 04:07

## 2019-07-28 RX ADMIN — INSULIN ASPART 3 UNITS: 100 INJECTION, SOLUTION INTRAVENOUS; SUBCUTANEOUS at 11:07

## 2019-07-28 RX ADMIN — SODIUM BICARBONATE: 84 INJECTION, SOLUTION INTRAVENOUS at 02:07

## 2019-07-28 RX ADMIN — PHENYLEPHRINE HYDROCHLORIDE 200 MCG: 10 INJECTION INTRAVENOUS at 11:07

## 2019-07-28 RX ADMIN — DEXMEDETOMIDINE HYDROCHLORIDE 1.2 MCG/KG/HR: 4 INJECTION INTRAVENOUS at 09:07

## 2019-07-28 RX ADMIN — PROPOFOL 5 MCG/KG/MIN: 10 INJECTION, EMULSION INTRAVENOUS at 11:07

## 2019-07-28 RX ADMIN — NOREPINEPHRINE BITARTRATE 0.25 MCG/KG/MIN: 1 INJECTION INTRAVENOUS at 08:07

## 2019-07-28 RX ADMIN — PIPERACILLIN AND TAZOBACTAM 4.5 G: 4; .5 INJECTION, POWDER, LYOPHILIZED, FOR SOLUTION INTRAVENOUS; PARENTERAL at 04:07

## 2019-07-28 RX ADMIN — PIPERACILLIN AND TAZOBACTAM 4.5 G: 4; .5 INJECTION, POWDER, LYOPHILIZED, FOR SOLUTION INTRAVENOUS; PARENTERAL at 11:07

## 2019-07-28 RX ADMIN — FAMOTIDINE 20 MG: 10 INJECTION INTRAVENOUS at 01:07

## 2019-07-28 RX ADMIN — FENTANYL CITRATE 12.5 MCG: 50 INJECTION, SOLUTION INTRAMUSCULAR; INTRAVENOUS at 06:07

## 2019-07-28 RX ADMIN — INSULIN ASPART 2 UNITS: 100 INJECTION, SOLUTION INTRAVENOUS; SUBCUTANEOUS at 04:07

## 2019-07-28 RX ADMIN — PROPOFOL 25 MCG/KG/MIN: 10 INJECTION, EMULSION INTRAVENOUS at 09:07

## 2019-07-28 RX ADMIN — PHENYLEPHRINE HYDROCHLORIDE 100 MCG: 10 INJECTION INTRAVENOUS at 10:07

## 2019-07-28 RX ADMIN — LIDOCAINE HYDROCHLORIDE 80 MG: 20 INJECTION, SOLUTION INTRAVENOUS at 10:07

## 2019-07-28 RX ADMIN — ROCURONIUM BROMIDE 5 MG: 10 INJECTION, SOLUTION INTRAVENOUS at 10:07

## 2019-07-28 RX ADMIN — CHLORHEXIDINE GLUCONATE 0.12% ORAL RINSE 15 ML: 1.2 LIQUID ORAL at 08:07

## 2019-07-28 RX ADMIN — FENTANYL CITRATE 50 MCG: 50 INJECTION INTRAMUSCULAR; INTRAVENOUS at 10:07

## 2019-07-28 RX ADMIN — SODIUM CHLORIDE: 0.9 INJECTION, SOLUTION INTRAVENOUS at 12:07

## 2019-07-28 RX ADMIN — DEXMEDETOMIDINE HYDROCHLORIDE 1.4 MCG/KG/HR: 4 INJECTION INTRAVENOUS at 05:07

## 2019-07-28 RX ADMIN — INSULIN ASPART 4 UNITS: 100 INJECTION, SOLUTION INTRAVENOUS; SUBCUTANEOUS at 07:07

## 2019-07-28 RX ADMIN — PROPOFOL 30 MG: 10 INJECTION, EMULSION INTRAVENOUS at 11:07

## 2019-07-28 RX ADMIN — DEXMEDETOMIDINE HYDROCHLORIDE 0.2 MCG/KG/HR: 4 INJECTION INTRAVENOUS at 02:07

## 2019-07-28 RX ADMIN — TRAMADOL HYDROCHLORIDE 50 MG: 50 TABLET, FILM COATED ORAL at 01:07

## 2019-07-28 RX ADMIN — MAGNESIUM SULFATE HEPTAHYDRATE 3 G: 500 INJECTION, SOLUTION INTRAMUSCULAR; INTRAVENOUS at 04:07

## 2019-07-28 RX ADMIN — INSULIN ASPART 3 UNITS: 100 INJECTION, SOLUTION INTRAVENOUS; SUBCUTANEOUS at 08:07

## 2019-07-28 RX ADMIN — Medication 0.15 MCG/KG/MIN: at 12:07

## 2019-07-28 RX ADMIN — SODIUM BICARBONATE: 84 INJECTION, SOLUTION INTRAVENOUS at 09:07

## 2019-07-28 RX ADMIN — PIPERACILLIN AND TAZOBACTAM 4.5 G: 4; .5 INJECTION, POWDER, LYOPHILIZED, FOR SOLUTION INTRAVENOUS; PARENTERAL at 08:07

## 2019-07-28 RX ADMIN — NOREPINEPHRINE BITARTRATE 0.3 MCG/KG/MIN: 1 INJECTION INTRAVENOUS at 01:07

## 2019-07-28 RX ADMIN — METOPROLOL TARTRATE 1 MG: 1 INJECTION, SOLUTION INTRAVENOUS at 10:07

## 2019-07-28 RX ADMIN — SUCCINYLCHOLINE CHLORIDE 160 MG: 20 INJECTION, SOLUTION INTRAMUSCULAR; INTRAVENOUS at 10:07

## 2019-07-28 RX ADMIN — ETOMIDATE 16 MG: 2 INJECTION, SOLUTION INTRAVENOUS at 10:07

## 2019-07-28 RX ADMIN — SODIUM CHLORIDE: 0.9 INJECTION, SOLUTION INTRAVENOUS at 07:07

## 2019-07-28 NOTE — CONSULTS
[facility] [place]  Urology  Consult Note    Patient Name: Dasia Abreu  MRN: [id number]  Admission Date: 7/27/2019  Hospital Length of Stay: 1   Code Status: Full Code   Attending Provider: Ministerio Crenshaw MD   Consulting Provider: CRISTHIAN Mendez MD  Primary Care Physician: Primary Doctor No  Principal Problem:Severe sepsis with septic shock    Inpatient consult to Urology  Consult performed by: PRETTY Mendez MD  Consult ordered by: Frank Hanna MD          Subjective:     HPI:  Urolithiasis  Patient complains of right abdominal pain with radiation to the abdomen. Onset of symptoms was abrupt starting 2 days ago with unchanged course since that time. Patient describes the pain as aching, intermittent and rated as moderate. The patient has had nausea and vomiting and diaphoresis. There has been fever and chills. The patient is not complaining of dysuria or frequency. Risk factors for urolithiasis: none.        Past Medical History:   Diagnosis Date    Diabetes mellitus     Diverticulitis     Hypertension     Thyroid disease        Past Surgical History:   Procedure Laterality Date    BACK SURGERY      BLADDER SUSPENSION      CATARACT EXTRACTION, BILATERAL      HYSTERECTOMY         Review of patient's allergies indicates:  No Known Allergies    Family History     None          Tobacco Use    Smoking status: Never Smoker   Substance and Sexual Activity    Alcohol use: Never     Frequency: Never    Drug use: Never    Sexual activity: Not on file       Review of Systems   Constitutional: Positive for chills and fever.   HENT: Negative.    Eyes: Negative.    Respiratory: Negative for cough, chest tightness and shortness of breath.    Cardiovascular: Negative for chest pain.   Gastrointestinal: Negative.  Negative for constipation, diarrhea and nausea.   Genitourinary: Positive for flank pain and frequency.   Neurological: Positive for headaches.   Psychiatric/Behavioral: Negative.         Objective:     Temp:  [98.2 °F (36.8 °C)-103.1 °F (39.5 °C)] 98.5 °F (36.9 °C)  Pulse:  [] 88  Resp:  [15-40] 20  SpO2:  [88 %-100 %] 99 %  BP: ()/(44-80) 116/59     Body mass index is 32.82 kg/m².    Date 07/28/19 0700 - 07/29/19 0659   Shift [id number] 9307-3018 24 Hour Total   INTAKE   I.V.(mL/kg) 603.7(7.7)   603.7(7.7)   Shift Total(mL/kg) 603.7(7.7)   603.7(7.7)   OUTPUT   Urine(mL/kg/hr) 130   130   Shift Total(mL/kg) 130(1.6)   130(1.6)   Weight (kg) 78.8 78.8 78.8 78.8          Drains     Drain                 Urethral Catheter 07/28/19 0100 Non-latex less than 1 day                Physical Exam   Nursing note and vitals reviewed.  Constitutional: She is oriented to person, place, and time. She appears well-developed.   HENT:   Head: Normocephalic.   Eyes: Conjunctivae are normal.   Neck: Normal range of motion. No tracheal deviation present. No thyromegaly present.   Cardiovascular: Normal rate, normal heart sounds and normal pulses.    Pulmonary/Chest: Effort normal and breath sounds normal. No respiratory distress. She has no wheezes.   Abdominal: Soft. She exhibits no distension and no mass. There is no hepatosplenomegaly. There is no tenderness. There is CVA tenderness. There is no rebound and no guarding. No hernia.   Musculoskeletal: Normal range of motion. She exhibits no edema or tenderness.   Lymphadenopathy:     She has no cervical adenopathy.   Neurological: She is alert and oriented to person, place, and time.   Skin: Skin is warm and dry. No rash noted. No erythema.     Psychiatric: She has a normal mood and affect. Her behavior is normal. Judgment and thought content normal.       Significant Labs:    BMP:  Recent Labs   Lab 07/27/19  1931 07/28/19  0310    135*   K 3.7 3.9    105   CO2 20* 21*   BUN 27* 29*   CREATININE 1.8* 2.4*   CALCIUM 9.6 8.2*       CBC:  Recent Labs   Lab 07/27/19  1931 07/28/19  0310   WBC 1.07* 12.09   HGB 11.3* 9.6*   HCT 35.7*  30.5*   * 139*       Blood Culture:   Recent Labs   Lab 07/27/19  1931 07/27/19  1935   LABBLOO Gram stain nilsa bottle:Gram negative rods  Gram stain aer bottle: Gram negative rods  Results called to and read back by:ICU BRENNEN ROGER   07/28/2019  07:37 Gram stain aer bottle: Gram negative rods  Gram stain nilsa bottle: Gram negative rods  Results called to and read back by: ICU BRENNEN ROGER 07/28/2019  07:40     Urine Culture: No results for input(s): LABURIN in the last 168 hours.    Significant Imaging:  CT: I have reviewed all results within the past 24 hours and my personal findings are:  Right hydronephrosis with stranding, right renal pelvic stone and right ureteral stone                    Assessment and Plan:     Obstructive pyelonephritis  NPO  Cystoscopy with urgent right ureteral stent placement  Consented and marked  Stay on scheduled antibiotics     Acute renal failure  Plan for stent  Continue IVF        VTE Risk Mitigation (From admission, onward)        Ordered     IP VTE LOW RISK PATIENT  Once      07/28/19 0018          Thank you for your consult. I will follow-up with patient. Please contact us if you have any additional questions.    CRISTHIAN Mendez MD  Urology  [facility]

## 2019-07-28 NOTE — ASSESSMENT & PLAN NOTE
Unfortunately we do not have previous lab work to which to compare to chronicity of her anemia, though she does not have any source of acute bleeding.  There is no indication for transfusion at this time.  Will continue to monitor.

## 2019-07-28 NOTE — HPI
Ms. Dasia Abreu is a 68 y.o. female from [place] with essential hypertension, type 2 diabetes mellitus (HbA1c unknown), CAD, and anemia chronic disease who presents to [facility] ED with complaints of diarrhea starting five days ago.  Her elderly mother lives with her in [place] and was driven into town five days ago to attend an appointment with her doctors.  Starting that they she started to watery stools that had resolved by the 2nd day.  Her friend recently bought a new car and she and her group of friends took the car to the [facility].  She started to feel very tired and lethargic and had chills once she arrived home.  She also complained of lower abdominal pain that started that day that was associated with urinary frequency; she denies any dysuria, hematuria, nor any urinary urgency.  Abdominal pain was nonradiating and was 8/10 in severity at its worst.  She also denies any chest pain, shortness of breath, palpitations, nor any diaphoresis.  She also reports right shoulder/right lower neck pain starting around the same time and started have a headache starting yesterday.  She denies any blurry vision.  She also denies any rashes nor joint pain. She has otherwise been in her usual state of health.      Currently on Levophed drip.  Underwent right ureteral stent today morning.  Troponin elevated at 0.7.  Last EKG shows sinus tachycardia with nonspecific ST changes.  No acute ST elevation changes noted.  Currently patient is intubated and all the history was obtained from the nurse as well as the patient chart.  The nurse states that the patient said that she had been complaining of worsening dyspnea on exertion.  The nurse also stated that the patient told her that she had undergone angiogram in the past and had a blockage, but nothing which required stents.  She also complained of right shoulder pain, denied chest pain.  In surgery she complained of stabbing back pain prior to intubation as per the  surgical staff.  She is visiting from [place] and apparently follows with a cardiologist in [place].

## 2019-07-28 NOTE — CONSULTS
[facility]  Pulmonology  Consult Note    Patient Name: Dasia Abreu  MRN: [id number]  Admission Date: 7/27/2019  Hospital Length of Stay: 1 days  Code Status: Full Code  Attending Physician: Ministerio Crenshaw MD  Primary Care Provider: Primary Doctor No   Principal Problem: Severe sepsis with septic shock    Inpatient consult to Pulmonology  Consult performed by: Torito Mcgee MD  Consult ordered by: Erasmo Garcia MD        Subjective:     HPI:  Patient is 68 y.o. female  has a past medical history of Diabetes mellitus, Diverticulitis, Hypertension, and Thyroid disease. presented to [facility] on 7/27/19 with diarrhea and abdominal pain.  + fever 103 and GNR from blood culture. CT abd with 4 mm right nephrolithiasis.   Patient was taken to OR this am for right ureteral stent.  Post op, patient was kept on vent and pulmonary was consulted for vent management.  Initial ABG with respiratory and metabolic acidosis.  Rate and tidal volume was adjusted was adjusted.      Currently on 40% fio2.  Levophed requirement is trending down.        Past Medical History:   Diagnosis Date    Diabetes mellitus     Diverticulitis     Hypertension     Thyroid disease        Past Surgical History:   Procedure Laterality Date    BACK SURGERY      BLADDER SUSPENSION      CATARACT EXTRACTION, BILATERAL      HYSTERECTOMY         Review of patient's allergies indicates:  No Known Allergies    Family History     None        Tobacco Use    Smoking status: Never Smoker   Substance and Sexual Activity    Alcohol use: Never     Frequency: Never    Drug use: Never    Sexual activity: Not on file         Review of Systems   Unable to perform ROS: Intubated     Objective:     Vital Signs (Most Recent):  Temp: 97.8 °F (36.6 °C) (07/28/19 1145)  Pulse: 85 (07/28/19 1513)  Resp: (!) 23 (07/28/19 1513)  BP: (!) 150/57 (07/28/19 1505)  SpO2: 100 % (07/28/19 1513) Vital Signs (24h Range):  Temp:  [97.7 °F (36.5  °C)-103.1 °F (39.5 °C)] 97.8 °F (36.6 °C)  Pulse:  [] 85  Resp:  [12-40] 23  SpO2:  [88 %-100 %] 100 %  BP: ()/() 150/57  Arterial Line BP: ()/(43-92) 155/68     Weight: 83.7 kg (184 lb 8.4 oz)  Body mass index is 34.87 kg/m².      Intake/Output Summary (Last 24 hours) at 7/28/2019 1627  Last data filed at 7/28/2019 1416  Gross per 24 hour   Intake 6431.11 ml   Output 785 ml   Net 5646.11 ml       Physical Exam   Constitutional: She appears well-developed.   HENT:   Head: Normocephalic and atraumatic.   Nose: Nose normal.   Mouth/Throat: Oropharynx is clear and moist.   Eyes: Pupils are equal, round, and reactive to light. EOM are normal.   Neck: Normal range of motion.   Cardiovascular: Normal rate, regular rhythm and normal heart sounds.   Pulmonary/Chest: Effort normal and breath sounds normal. No respiratory distress. She has no wheezes. She has no rales. She exhibits no tenderness.   Abdominal: Soft. Bowel sounds are normal. She exhibits no mass. There is no rebound and no guarding.   Genitourinary:   Genitourinary Comments: Sosa in place.  +bloody urine.     Musculoskeletal: She exhibits no edema or tenderness.   Neurological: She is alert. No cranial nerve deficit. Coordination normal.   Follows command.     Skin: No rash noted. No erythema. No pallor.   Psychiatric: She has a normal mood and affect. Her behavior is normal.       Vents:  Vent Mode: SIMV (PC) + PS (07/28/19 1513)  Ventilator Initiated: Yes (07/28/19 1137)  Set Rate: 22 bmp (07/28/19 1513)  Vt Set: 450 mL (07/28/19 1513)  PEEP/CPAP: 5 cmH20 (07/28/19 1513)  Oxygen Concentration (%): 40 (07/28/19 1513)  Peak Airway Pressure: 26.1 cmH2O (07/28/19 1513)  Total Ve: 10.2 mL (07/28/19 1513)  F/VT Ratio<105 (RSBI): (!) 49.68 (07/28/19 1513)    Lines/Drains/Airways     Central Venous Catheter Line                 Percutaneous Central Line Insertion/Assessment - triple lumen  07/27/19 2220 right internal jugular less than 1 day           Drain                 Urethral Catheter 07/28/19 0100 Non-latex less than 1 day          Airway                 Airway - Non-Surgical 07/28/19 1035 Endotracheal Tube less than 1 day          Arterial Line                 Arterial Line 07/28/19 1006 Right Radial less than 1 day          Peripheral Intravenous Line                 Peripheral IV - Single Lumen 07/27/19 20 G Right Upper Arm 1 day                Significant Labs:    CBC/Anemia Profile:  Recent Labs   Lab 07/27/19 1931 07/28/19 0310 07/28/19  1153   WBC 1.07* 12.09 21.83*   HGB 11.3* 9.6* 10.1*   HCT 35.7* 30.5* 32.4*   * 139* 140*   MCV 94 94 96   RDW 14.1 14.3 14.5        Chemistries:  Recent Labs   Lab 07/27/19 1931 07/28/19 0310 07/28/19  1153    135* 137   K 3.7 3.9 4.3    105 107   CO2 20* 21* 13*   BUN 27* 29* 30*   CREATININE 1.8* 2.4* 2.2*   CALCIUM 9.6 8.2* 8.2*   ALBUMIN 3.4* 2.7* 2.9*   PROT 6.3 5.2* 6.2   BILITOT 1.1* 0.8 0.7   ALKPHOS 73 43* 50*   ALT 25 18 24   AST 20 24 32   MG  --  0.9*  --    PHOS  --  2.4*  --        ABGs:   Recent Labs   Lab 07/28/19  1316   PH 7.254*   PCO2 32.7*   HCO3 14.5*   POCSATURATED 97   BE -12     Echo 7/28/19  · Normal left ventricular systolic function. The estimated ejection fraction is 65%  · Concentric left ventricular remodeling.  · Normal LV diastolic function.  · Normal right ventricular systolic function.  · Mild left atrial enlargement.  · Mild mitral regurgitation.  · Mild tricuspid regurgitation.  · Normal central venous pressure (3 mm Hg).  · The estimated PA systolic pressure is 12 mm     Significant Imaging:   CXR: I have reviewed all pertinent results/findings within the past 24 hours and my personal findings are:  7/28/19 bilateral reticular markings.  appear worse when compared to 7/27/19 cxr.      Assessment/Plan:     * Severe sepsis with septic shock  gnr from blood culture.  Most likely urosepsis.  S/p ureteral stenting.  Appear to have adequate source  control aeb lessening pressor requirement.  Currently with zosyn/vanco.  Should consider stopping vanco.      Metabolic acidosis  Anion gap 17.  Most likely from lactic acidosis and arf.  hco3 drip.  Repeat abg improving.      Respiratory failure  abg improve acidosis with improve ventilation.  Oxygenation improved despite worsening cxr.  Possible sbt in am pending acid base status.      Elevated troponin  Most likely demand ischemia.  Card is on boad.      Acute renal failure  Most likely obstructive.  Good urine output.  Will monitor.          Thank you for your consult. I will follow-up with patient. Please contact us if you have any additional questions.     Torito Mcgee MD  Pulmonology  [facility]  Critical Care Time: 45  minutes  Critical secondary to septic shock and respiratory failure     Critical care was time spent personally by me on the following activities: development of treatment plan with patient or surrogate and bedside caregivers, discussions with consultants, evaluation of patient's response to treatment, examination of patient, ordering and performing treatments and interventions, ordering and review of laboratory studies, ordering and review of radiographic studies, pulse oximetry, re-evaluation of patient's condition.  This critical care time did not overlap with that of any other provider or involve time for any procedures.

## 2019-07-28 NOTE — ASSESSMENT & PLAN NOTE
Review of her previous medical records reveal that she is on metformin and mixed insulin therapy; will provide basal-prandial insulin therapy along with insulin sliding scale.  HbA1c is pending.

## 2019-07-28 NOTE — ASSESSMENT & PLAN NOTE
abg improve acidosis with improve ventilation.  Oxygenation improved despite worsening cxr.  Possible sbt in am pending acid base status.

## 2019-07-28 NOTE — PROGRESS NOTES
eICU Brief Admission Note       Briefly, 68 y.o. female with essential hypertension, type 2 diabetes mellitus (HbA1c unknown), CAD, diverticulitis, thyroid disease and anemia chronic disease who presents to [facility] ED with complaints of fever and associated lower abdominal pain, posterior HA, myalgia, dysuria, cough, and generalized weakness beginning 3 days ago, worsening this morning. Per EMS she was found lethargic at home, with blood glucose of 178 mmHg, blood pressure of 130s/ 80s and was 92% on room air, being placed on 1 liter of oxygen by EMS.        Video assessment :    Vitals reviewed   Afebrile, stable vitals     LABs reviewed       Radiology reviewed   CT A/P   4 mm right proximal ureteral calculus with associated mild hydronephrosis and moderate perinephric infiltration.  4 mm nonobstructing pelvic calculus.  Hepatosplenomegaly.  Hepatic steatosis.  Distal descending colon and sigmoid colon diverticulosis without evidence of diverticulitis.    CT head   No acute intracranial process.  Additional evaluation, as clinically warranted.  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        Assessment / Plan :  # septic shock, lactic acidosis , likely pyelonephritis , obstructive UTI   # Neutropenia ,    # Anemia , Thrombocytopenia   # MINI/CKD   # DM   - ABX : s/p Vanco ; on Zosyn; f/u cultures   - on Levophed   - Urology consult to relive obstruction   - SSI, premeal Insulin, Detemir         DVT Px : SCD, will defer to hospitalist for drug therapy   GI Px : N/A      Patient seen over video : Yes  Chart reviewed : Yes  Spoke with RN : Yes

## 2019-07-28 NOTE — HPI
Patient is 68 y.o. female  has a past medical history of Diabetes mellitus, Diverticulitis, Hypertension, and Thyroid disease. presented to [facility] on 7/27/19 with diarrhea and abdominal pain.  + fever 103 and GNR from blood culture. CT abd with 4 mm right nephrolithiasis.   Patient was taken to OR this am for right ureteral stent.  Post op, patient was kept on vent and pulmonary was consulted for vent management.  Initial ABG with respiratory and metabolic acidosis.  Rate and tidal volume was adjusted was adjusted.      Currently on 40% fio2.  Levophed requirement is trending down.

## 2019-07-28 NOTE — OP NOTE
Date of Procedure: 07/28/2019     Preoperative Diagnosis:  Sepsis, right ureteral stone, obstructive uropathy    Postoperative Diagnosis:  Sepsis, right ureteral stone, obstructive uropathy    Primary Surgeon: PRETTY Mendez MD    Anesthesia:  General    Procedure Performed:  Cystoscopy, right ureteral stent placement, Sosa catheter, placement fluoroscopy    Estimated Blood Loss:  Minimal    Drains:  6 Gambian x 26 cm double-J stent, no string, 16 Gambian Sosa catheter    Specimens Removed:  None    Complications:  None    Indications: Dasia Abreu is a 68-year-old woman with sepsis.  Imaging showed a right ureteral stone with hydronephrosis.  Recommended that she undergo right ureteral stent placement    Procedure in Detail:    Dasia Abreu taken to the operating room where she was positively identified by alison.  She has placed supine the operating room table.  Following induction of adequate general anesthesia cc placed in the dorsal lithotomy position and her external genitalia were prepped and draped in usual sterile fashion.    The preoperative time-out was performed as well as confirmation of preoperative antibiotics and preoperative marking on the right side    A  film was taking using fluoroscopy.    A 22 Gambian rigid cystoscope was then passed per urethra into the bladder under direct vision.  The bladder was seen to be hyperemic consistent with urinary tract infection.    Was ureteral orifices were identified there seen to be in orthotopic position.    I then passed a 5 Gambian open-ended catheter into the bladder using the scope.  I then directed a hydrophilic tip Sensor wire through the open-ended catheter intubating the right ureteral orifice the wire was passed up to the level of the kidney.  There is mild resistance noted consistent with a proximal ureteral stone.  The open-ended catheter was withdrawn.    The right ureteral orifice was then seen to be draining purulence once the wire was  then placed.    I then passed a 6 Paraguayan by 26 cm double-J stent over the wire into the right kidney.  Coil was seen deployed within the right kidney confirmed on fluoroscopy and a coil was seen deployed within the bladder confirmed visually.  Purulence continue to drain.    The scope was withdrawn    A 16 Paraguayan Sosa catheter was then placed for postoperative drainage.    Her anesthesia was reversed he was then taken back to the ICU in stable condition.

## 2019-07-28 NOTE — ANESTHESIA POSTPROCEDURE EVALUATION
Anesthesia Post Evaluation    Patient: Dasia Abreu    Procedure(s) Performed: Procedure(s) (LRB):  CYSTOSCOPY, WITH URETERAL STENT INSERTION (Bilateral)    Final Anesthesia Type: general  Patient location during evaluation: ICU  Patient participation: No - Unable to Participate, Intubation  Level of consciousness: sedated  Post-procedure vital signs: reviewed and stable  Pain management: adequate  Airway patency: patent  PONV status at discharge: No PONV  Anesthetic complications: yes  Perioperative Events: other periop events (see comments)  Susan-operative Events Comments: Patient needed post op ventilation due to poor respiratory effort..in icu at this time..troponins elevated..cxr with increased perihilar pulmonary edema..echo shows hyperdynamic state  Cardiovascular status: blood pressure returned to baseline, hemodynamically stable and stable  Respiratory status: unassisted and spontaneous ventilation  Hydration status: euvolemic  Follow-up not needed.          Vitals Value Taken Time   /58 7/28/2019  6:17 PM   Temp 36.7 °C (98 °F) 7/28/2019  3:13 PM   Pulse 90 7/28/2019  6:21 PM   Resp 31 7/28/2019  6:21 PM   SpO2 99 % 7/28/2019  6:21 PM   Vitals shown include unvalidated device data.      No case tracking events are documented in the log.      Pain/Gloria Score: Pain Rating Prior to Med Admin: 0 (7/28/2019  6:06 PM)  Pain Rating Post Med Admin: 3 (7/28/2019  5:53 AM)

## 2019-07-28 NOTE — PROGRESS NOTES
Patient cv stable in or..at end of case patient with poor tidal volumes..decision for post op ventilation made. No other issues.

## 2019-07-28 NOTE — ANESTHESIA PREPROCEDURE EVALUATION
07/28/2019  Dasia Abreu is a 68 y.o., female.    Anesthesia Evaluation         Review of Systems  Anesthesia Hx:  No problems with previous Anesthesia   Cardiovascular:   Hypertension CAD  Dysrhythmias (controlled by metaprolol per patient)  troponins noted by Den who felt cardiac issues were secondary to sepsis from renal calculi..cardiology to follow  Patient noted a stess test 1 year ago negative   Renal/:   Chronic Renal Disease    Endocrine:   Diabetes        Physical Exam  General:  Well nourished, Morbid Obesity    Airway/Jaw/Neck:  Airway Findings: Mallampati: III TM Distance: < 4 cm      Dental:  DENTAL FINDINGS: Normal   Chest/Lungs:  Chest/Lungs Clear    Heart/Vascular:  Heart Findings: Normal       Mental Status:  Mental Status Findings:  Cooperative, Alert and Oriented         Anesthesia Plan  Type of Anesthesia, risks & benefits discussed:  Anesthesia Type:  general  Patient's Preference:   Intra-op Monitoring Plan: standard ASA monitors  Intra-op Monitoring Plan Comments:   Post Op Pain Control Plan: multimodal analgesia, IV/PO Opioids PRN and per primary service following discharge from PACU  Post Op Pain Control Plan Comments:   Induction:    Beta Blocker:  Patient is not currently on a Beta-Blocker (No further documentation required).       Informed Consent: Patient understands risks and agrees with Anesthesia plan.  Questions answered. Anesthesia consent signed with patient.  ASA Score: 3     Day of Surgery Review of History & Physical:    H&P update referred to the provider.  H&P completed by Anesthesiologist.   Anesthesia Plan Notes: General anesthesia  A line to closely watch pressures  Npo  Patient with diagnosis of sepsis on levophen which will be continued        Ready For Surgery From Anesthesia Perspective.

## 2019-07-28 NOTE — CARE UPDATE
Patient seen and examined.  Agree with Dr. Hanna's treatment and plan.  Septic shock secondary to obstructive uropathy from ureteral calculus.  Now also noted to have GNR bacteremia.  Stop Vanc and continue Zosyn.  Currently on Levophed.  Patient noted to have increase in Troponin.  Hx of CAD, but no chest pain.  Cardiology consulted.  Elevated Troponin probably related to demand from septic shock.  Her main issue right now is septic shock secondary to obstructive uropathy.  May eventually need ischemic work up, but would not delay urgent cystoscopy and stent placement right now.

## 2019-07-28 NOTE — ED NOTES
MD at bedside speaking to pt and family about central line placement. Consent signed per pt sister at this time.

## 2019-07-28 NOTE — SUBJECTIVE & OBJECTIVE
Past Medical History:   Diagnosis Date    Diabetes mellitus     Diverticulitis     Hypertension     Thyroid disease        Past Surgical History:   Procedure Laterality Date    BACK SURGERY      BLADDER SUSPENSION      CATARACT EXTRACTION, BILATERAL      HYSTERECTOMY         Review of patient's allergies indicates:  No Known Allergies    No current facility-administered medications on file prior to encounter.      No current outpatient medications on file prior to encounter.     Family History     None        Tobacco Use    Smoking status: Never Smoker   Substance and Sexual Activity    Alcohol use: Never     Frequency: Never    Drug use: Never    Sexual activity: Not on file     Review of Systems   Unable to perform ROS: intubated     Objective:     Vital Signs (Most Recent):  Temp: 98.5 °F (36.9 °C) (07/28/19 0815)  Pulse: 88 (07/28/19 1000)  Resp: (!) 21 (07/28/19 1000)  BP: (!) 128/56 (07/28/19 1000)  SpO2: 98 % (07/28/19 1000) Vital Signs (24h Range):  Temp:  [98.2 °F (36.8 °C)-103.1 °F (39.5 °C)] 98.5 °F (36.9 °C)  Pulse:  [] 88  Resp:  [15-40] 21  SpO2:  [88 %-100 %] 98 %  BP: ()/(44-80) 128/56     Weight: 83.7 kg (184 lb 8.4 oz)  Body mass index is 34.87 kg/m².    SpO2: 98 %  O2 Device (Oxygen Therapy): nasal cannula      Intake/Output Summary (Last 24 hours) at 7/28/2019 1125  Last data filed at 7/28/2019 1000  Gross per 24 hour   Intake 5029.59 ml   Output 370 ml   Net 4659.59 ml       Lines/Drains/Airways     Central Venous Catheter Line                 Percutaneous Central Line Insertion/Assessment - triple lumen  07/27/19 2220 right internal jugular less than 1 day          Drain                 Urethral Catheter 07/28/19 0100 Non-latex less than 1 day          Airway                 Airway - Non-Surgical 07/28/19 1035 Endotracheal Tube less than 1 day          Arterial Line                 Arterial Line 07/28/19 1006 Right Radial less than 1 day          Peripheral Intravenous  Line                 Peripheral IV - Single Lumen 07/27/19 20 G Right Upper Arm 1 day                Physical Exam   Constitutional: She is oriented to person, place, and time. She appears well-developed and well-nourished. She has a sickly appearance. She is intubated.   HENT:   Head: Normocephalic.   Eyes: Pupils are equal, round, and reactive to light.   Neck: Normal range of motion. Neck supple.   Cardiovascular: Regular rhythm. Tachycardia present.   Pulmonary/Chest: Effort normal and breath sounds normal. She is intubated.   Abdominal: Soft. Normal appearance and bowel sounds are normal. There is no tenderness.   Musculoskeletal: She exhibits no edema, tenderness or deformity.   Neurological: She is alert and oriented to person, place, and time.   Unable to perform as patient intubated   Skin: Skin is warm.   Psychiatric: She has a normal mood and affect.   Nursing note and vitals reviewed.      Significant Labs:   BMP:   Recent Labs   Lab 07/27/19 1931 07/28/19  0310   * 226*    135*   K 3.7 3.9    105   CO2 20* 21*   BUN 27* 29*   CREATININE 1.8* 2.4*   CALCIUM 9.6 8.2*   MG  --  0.9*   , CMP   Recent Labs   Lab 07/27/19 1931 07/28/19  0310    135*   K 3.7 3.9    105   CO2 20* 21*   * 226*   BUN 27* 29*   CREATININE 1.8* 2.4*   CALCIUM 9.6 8.2*   PROT 6.3 5.2*   ALBUMIN 3.4* 2.7*   BILITOT 1.1* 0.8   ALKPHOS 73 43*   AST 20 24   ALT 25 18   ANIONGAP 14 9   ESTGFRAFRICA 33* 23*   EGFRNONAA 29* 20*   , CBC   Recent Labs   Lab 07/27/19 1931 07/28/19  0310   WBC 1.07* 12.09   HGB 11.3* 9.6*   HCT 35.7* 30.5*   * 139*   , INR No results for input(s): INR, PROTIME in the last 48 hours., Lipid Panel No results for input(s): CHOL, HDL, LDLCALC, TRIG, CHOLHDL in the last 48 hours., Troponin   Recent Labs   Lab 07/27/19 1931 07/28/19  0310   TROPONINI 0.081* 0.739*    and All pertinent lab results from the last 24 hours have been reviewed.    Significant Imaging: EKG:  Personally reviewed as above

## 2019-07-28 NOTE — ASSESSMENT & PLAN NOTE
NPO  Cystoscopy with urgent right ureteral stent placement  Consented and marked  Stay on scheduled antibiotics

## 2019-07-28 NOTE — PROGRESS NOTES
Pharmacokinetic Initial Assessment: IV Vancomycin    Assessment/Plan:    Initiate intravenous vancomycin with loading dose of 2000 mg once followed by a maintenance dose of vancomycin 500 mg IV every 24 hours  Desired empiric serum trough concentration is 10 to 20 mcg/mL.  Draw vancomycin trough level 30 min prior to third dose on 7/29/19 at approximately 1930   Pharmacy will continue to follow and monitor vancomycin.      Please contact pharmacy at extension [phone] with any questions regarding this assessment.     Thank you for the consult,   Kaden Wallace     Patient brief summary:  Dasia Abreu is a 68 y.o. female initiated on antimicrobial therapy with IV Vancomycin for treatment of suspected neutropenic fever    Drug Allergies:   Review of patient's allergies indicates:  No Known Allergies    Actual Body Weight:   78.8 kg    Renal Function:   Estimated Creatinine Clearance: 28.4 mL/min (A) (based on SCr of 1.8 mg/dL (H)).,     Dialysis Method (if applicable):  None     CBC (last 72 hours):  Recent Labs   Lab Result Units 07/27/19 1931   WBC K/uL 1.07*   Hemoglobin g/dL 11.3*   Hematocrit % 35.7*   Platelets K/uL 127*   Gran% % 44.0   Lymph% % 40.0   Mono% % 4.0   Eosinophil% % 0.0   Basophil% % 0.0   Differential Method  Manual       Metabolic Panel (last 72 hours):  Recent Labs   Lab Result Units 07/27/19 1931 07/27/19 1942   Sodium mmol/L 138  --    Sodium Urine Random mmol/L  --  114   Potassium mmol/L 3.7  --    Chloride mmol/L 104  --    CO2 mmol/L 20*  --    Glucose mg/dL 165*  --    Glucose, UA   --  1+*   BUN, Bld mg/dL 27*  --    Creatinine mg/dL 1.8*  --    Creatinine, Random Ur mg/dL  --  64.1   Albumin g/dL 3.4*  --    Total Bilirubin mg/dL 1.1*  --    Alkaline Phosphatase U/L 73  --    AST U/L 20  --    ALT U/L 25  --        Drug levels (last 3 results):  No results for input(s): VANCOMYCINRA, VANCOMYCINPE, VANCOMYCINTR in the last 72 hours.    Microbiologic Results:  Microbiology Results  (last 7 days)       Procedure Component Value Units Date/Time    Blood culture x two cultures. Draw prior to antibiotics. [[id number]] Collected:  07/27/19 1935    Order Status:  Sent Specimen:  Blood from Peripheral, Hand, Right Updated:  07/1950    Blood culture x two cultures. Draw prior to antibiotics. [[id number]] Collected:  07/27/19 1931    Order Status:  Sent Specimen:  Blood from Peripheral, Antecubital, Right Updated:  07/27/19 1949

## 2019-07-28 NOTE — ASSESSMENT & PLAN NOTE
She was noted on cardiac CT-A to have nonobstructive coronary artery disease in September 2018 and has been following up with a cardiologist in [place].  Will encourage continued follow-up.

## 2019-07-28 NOTE — TRANSFER OF CARE
"Anesthesia Transfer of Care Note    Patient: Dasia Abreu    Procedure(s) Performed: Procedure(s) (LRB):  CYSTOSCOPY, WITH URETERAL STENT INSERTION (Bilateral)    Patient location: ICU    Anesthesia Type: general    Transport from OR: Transported from OR intubated on 100% O2 by AMBU with adequate controlled ventilation    Post pain: adequate analgesia    Post assessment: no apparent anesthetic complications and tolerated procedure well    Post vital signs: stable    Level of consciousness: responds to stimulation    Complications: none    Transfer of care protocol was followed      Last vitals:   Visit Vitals  /64   Pulse 105   Temp 36.5 °C (97.7 °F)   Resp 12   Ht 5' 1" (1.549 m)   Wt 83.7 kg (184 lb 8.4 oz)   SpO2 97%   Breastfeeding? No   BMI 34.87 kg/m²     "

## 2019-07-28 NOTE — ASSESSMENT & PLAN NOTE
As per chart, history of nonobstructive coronary artery disease.  Try to get records from patient's cardiologist

## 2019-07-28 NOTE — SUBJECTIVE & OBJECTIVE
Past Medical History:   Diagnosis Date    Diabetes mellitus     Diverticulitis     Hypertension     Thyroid disease        Past Surgical History:   Procedure Laterality Date    BACK SURGERY      BLADDER SUSPENSION      CATARACT EXTRACTION, BILATERAL      HYSTERECTOMY         Review of patient's allergies indicates:  No Known Allergies    No current facility-administered medications on file prior to encounter.      No current outpatient medications on file prior to encounter.     Family History     Noncontributory        Tobacco Use    Smoking status: Never Smoker   Substance and Sexual Activity    Alcohol use: Never     Frequency: Never    Drug use: Never    Sexual activity: Not on file     Review of Systems   Constitutional: Positive for activity change, appetite change and chills. Negative for diaphoresis, fatigue, fever and unexpected weight change.   HENT: Negative.    Eyes: Negative.    Respiratory: Negative for cough, chest tightness, shortness of breath and wheezing.    Cardiovascular: Negative for chest pain, palpitations and leg swelling.   Gastrointestinal: Positive for abdominal pain and diarrhea. Negative for abdominal distention, blood in stool, constipation, nausea and vomiting.   Genitourinary: Positive for frequency. Negative for dysuria, hematuria and urgency.   Musculoskeletal: Negative.    Skin: Negative.    Neurological: Positive for headaches. Negative for dizziness, seizures, syncope, weakness and light-headedness.   Psychiatric/Behavioral: Negative.      Objective:     Vital Signs (Most Recent):  Temp: 98.2 °F (36.8 °C) (07/28/19 0026)  Pulse: (!) 111 (07/28/19 0040)  Resp: (!) 24 (07/28/19 0040)  BP: (!) 88/52 (07/28/19 0030)  SpO2: 98 % (07/28/19 0040) Vital Signs (24h Range):  Temp:  [98.2 °F (36.8 °C)-103.1 °F (39.5 °C)] 98.2 °F (36.8 °C)  Pulse:  [110-155] 111  Resp:  [15-40] 24  SpO2:  [88 %-100 %] 98 %  BP: ()/(44-80) 88/52     Weight: 78.8 kg (173 lb 11.6 oz)  Body  mass index is 32.82 kg/m².    Physical Exam   Constitutional: She is oriented to person, place, and time. She appears well-developed and well-nourished. No distress.   HENT:   Head: Normocephalic and atraumatic.   Right Ear: External ear normal.   Left Ear: External ear normal.   Nose: Nose normal.   Right IJ central venous line in place; see she has tenderness to palpation over the right shoulder and right lower neck, no pain on flexion of the neck and no limitation of head rotation   Eyes: Right eye exhibits no discharge. Left eye exhibits no discharge.   Neck: Normal range of motion.   Cardiovascular:   Tachycardic but regular rhythm; no murmurs or gallops   Pulmonary/Chest: Effort normal. No stridor. No respiratory distress. She has no wheezes. She has no rales. She exhibits no tenderness.   Abdominal: Soft. Bowel sounds are normal. She exhibits no distension. There is no tenderness. There is no rebound and no guarding.   Musculoskeletal: Normal range of motion. She exhibits no edema.   Neurological: She is alert and oriented to person, place, and time.   Skin: Skin is warm and dry. She is not diaphoretic. No erythema.   Psychiatric: She has a normal mood and affect. Her behavior is normal. Judgment and thought content normal.   Nursing note and vitals reviewed.          Significant Labs: All pertinent labs within the past 24 hours have been reviewed.    Significant Imaging: I have reviewed and interpreted all pertinent imaging results/findings within the past 24 hours.

## 2019-07-28 NOTE — ASSESSMENT & PLAN NOTE
The patient presented with normal blood pressures but soon became hypotensive despite an adequate IV fluid challenge.  She was placed a central venous catheter and started on vasopressor support with norepinephrine.  She has also been febrile to 103.1° F and does also meet other sepsis criteria with tachycardia, tachypnea, leukopenia, and lactic acidosis.  A clear source has yet been identified as a urinalysis is normal.  I also reviewed her chest radiograph which was also benign.  Curiously, her CT-abd/pelvis was significant for 4 mm right proximal ureteral calculus with associated mild hydronephrosis and moderate perinephric infiltration.  4 mm nonobstructing pelvic calculus.  A lumbar puncture was attempted but she has hardware in her back (L4 L5 pin) which makes a safe attempt difficult; she has no meningismus.  She also is absent any rash, heart murmur, back pain, nor joint effusions.  She has been started on empiric antibiotic therapy with vancomycin, piperacillin/tazobactam, and ampicillin pending blood and urine cultures.    Of note, her absolute neutrophil count is 556 and in the setting of a fever, she will be placed in neutropenic precautions.    Will consult Interventional Radiology for lumbar puncture.

## 2019-07-28 NOTE — H&P
[facility] Hospital Medicine  History & Physical    Patient Name: Dasia Abreu  MRN: [id number]  Admission Date: 7/27/2019  Attending Physician: Ministerio Crenshaw MD   Primary Care Provider: Primary Doctor No         Patient information was obtained from patient.     Subjective:     Principal Problem:Severe sepsis with septic shock    Chief Complaint:  Diarrhea five days ago.    HPI: Ms. Dasia Abreu is a 68 y.o. female from [place] with essential hypertension, type 2 diabetes mellitus (HbA1c unknown), CAD, and anemia chronic disease who presents to [facility] ED with complaints of diarrhea starting five days ago.  Her elderly mother lives with her in [place] and was driven into town five days ago to attend an appointment with her doctors.  Starting that they she started to watery stools that had resolved by the 2nd day.  Her friend recently bought a new car and she and her group of friends took the car to the [facility].  She started to feel very tired and lethargic and had chills once she arrived home.  She also complained of lower abdominal pain that started that day that was associated with urinary frequency; she denies any dysuria, hematuria, nor any urinary urgency.  Abdominal pain was nonradiating and was 8/10 in severity at its worst.  She also denies any chest pain, shortness of breath, palpitations, nor any diaphoresis.  She also reports right shoulder/right lower neck pain starting around the same time and started have a headache starting yesterday.  She denies any blurry vision.  She also denies any rashes nor joint pain. She has otherwise been in her usual state of health.    Chart Review:  Patient has not had any recent hospitalizations or outpatient clinic visits within the system.    Past Medical History:   Diagnosis Date    Diabetes mellitus     Diverticulitis     Hypertension     Thyroid disease        Past Surgical History:   Procedure Laterality Date    BACK SURGERY       BLADDER SUSPENSION      CATARACT EXTRACTION, BILATERAL      HYSTERECTOMY         Review of patient's allergies indicates:  No Known Allergies    No current facility-administered medications on file prior to encounter.      No current outpatient medications on file prior to encounter.     Family History     Noncontributory        Tobacco Use    Smoking status: Never Smoker   Substance and Sexual Activity    Alcohol use: Never     Frequency: Never    Drug use: Never    Sexual activity: Not on file     Review of Systems   Constitutional: Positive for activity change, appetite change and chills. Negative for diaphoresis, fatigue, fever and unexpected weight change.   HENT: Negative.    Eyes: Negative.    Respiratory: Negative for cough, chest tightness, shortness of breath and wheezing.    Cardiovascular: Negative for chest pain, palpitations and leg swelling.   Gastrointestinal: Positive for abdominal pain and diarrhea. Negative for abdominal distention, blood in stool, constipation, nausea and vomiting.   Genitourinary: Positive for frequency. Negative for dysuria, hematuria and urgency.   Musculoskeletal: Negative.    Skin: Negative.    Neurological: Positive for headaches. Negative for dizziness, seizures, syncope, weakness and light-headedness.   Psychiatric/Behavioral: Negative.      Objective:     Vital Signs (Most Recent):  Temp: 98.2 °F (36.8 °C) (07/28/19 0026)  Pulse: (!) 111 (07/28/19 0040)  Resp: (!) 24 (07/28/19 0040)  BP: (!) 88/52 (07/28/19 0030)  SpO2: 98 % (07/28/19 0040) Vital Signs (24h Range):  Temp:  [98.2 °F (36.8 °C)-103.1 °F (39.5 °C)] 98.2 °F (36.8 °C)  Pulse:  [110-155] 111  Resp:  [15-40] 24  SpO2:  [88 %-100 %] 98 %  BP: ()/(44-80) 88/52     Weight: 78.8 kg (173 lb 11.6 oz)  Body mass index is 32.82 kg/m².    Physical Exam   Constitutional: She is oriented to person, place, and time. She appears well-developed and well-nourished. No distress.   HENT:   Head: Normocephalic and  atraumatic.   Right Ear: External ear normal.   Left Ear: External ear normal.   Nose: Nose normal.   Right IJ central venous line in place; see she has tenderness to palpation over the right shoulder and right lower neck, no pain on flexion of the neck and no limitation of head rotation   Eyes: Right eye exhibits no discharge. Left eye exhibits no discharge.   Neck: Normal range of motion.   Cardiovascular:   Tachycardic but regular rhythm; no murmurs or gallops   Pulmonary/Chest: Effort normal. No stridor. No respiratory distress. She has no wheezes. She has no rales. She exhibits no tenderness.   Abdominal: Soft. Bowel sounds are normal. She exhibits no distension. There is no tenderness. There is no rebound and no guarding.   Musculoskeletal: Normal range of motion. She exhibits no edema.   Neurological: She is alert and oriented to person, place, and time.   Skin: Skin is warm and dry. She is not diaphoretic. No erythema.   Psychiatric: She has a normal mood and affect. Her behavior is normal. Judgment and thought content normal.   Nursing note and vitals reviewed.          Significant Labs: All pertinent labs within the past 24 hours have been reviewed.    Significant Imaging: I have reviewed and interpreted all pertinent imaging results/findings within the past 24 hours.    Assessment/Plan:     * Severe sepsis with septic shock  The patient presented with normal blood pressures but soon became hypotensive despite an adequate IV fluid challenge.  She was placed a central venous catheter and started on vasopressor support with norepinephrine.  She has also been febrile to 103.1° F and does also meet other sepsis criteria with tachycardia, tachypnea, leukopenia, and lactic acidosis.  A clear source has yet been identified as a urinalysis is normal.  I also reviewed her chest radiograph which was also benign.  Curiously, her CT-abd/pelvis was significant for 4 mm right proximal ureteral calculus with associated  mild hydronephrosis and moderate perinephric infiltration.  4 mm nonobstructing pelvic calculus.  A lumbar puncture was attempted but she has hardware in her back (L4 L5 pin) which makes a safe attempt difficult; she has no meningismus.  She also is absent any rash, heart murmur, back pain, nor joint effusions.  She has been started on empiric antibiotic therapy with vancomycin, piperacillin/tazobactam, and ampicillin pending blood and urine cultures.    Of note, her absolute neutrophil count is 556 and in the setting of a fever, she will be placed in neutropenic precautions.    Will consult Interventional Radiology for lumbar puncture.    Acute renal failure  We do not have previous lab work to which compared to chronicity of her renal failure but it is presumed to be acute in chronicity.  She is on metformin at home which suggests she has normal renal function.  Patient's urinalysis is significant for a specific gravity of 1.015 and 1+ glucose; it is otherwise benign.  Urine output has been fair.  Will obtain additional urine studies; provide aggressive IV fluid hydration; monitor the urine output; recheck the renal function in the morning; and avoid nephrotoxins.  Will obtain bilateral renal ultrasound, intact PTH, and vitamin-D.    Essential hypertension  As addressed above.    Type 2 diabetes mellitus  Review of her previous medical records reveal that she is on metformin and mixed insulin therapy; will provide basal-prandial insulin therapy along with insulin sliding scale.  HbA1c is pending.    Coronary artery disease  She was noted on cardiac CT-A to have nonobstructive coronary artery disease in September 2018 and has been following up with a cardiologist in [place].  Will encourage continued follow-up.    Anemia of chronic disease  Unfortunately we do not have previous lab work to which to compare to chronicity of her anemia, though she does not have any source of acute bleeding.  There is no  indication for transfusion at this time.  Will continue to monitor.    VTE Risk Mitigation (From admission, onward)        Ordered     IP VTE LOW RISK PATIENT  Once      07/28/19 0018        Critical care time spent on the evaluation and treatment of severe organ dysfunction, review of pertinent labs and imaging studies, discussions with consulting providers and discussions with patient/family: 60 minutes.           Frank Hanna M.D.  Staff [facility]  Department of Hospital Medicine  [facility]  Pager: [phone]          N.B.: Portions of this note was dictated using M*Modal Fluency Direct--there may be voice recognition errors occasionally missed on review.

## 2019-07-28 NOTE — BRIEF OP NOTE
[facility]  Brief Operative Note    SUMMARY     Surgery Date: 7/28/2019     Surgeon(s) and Role:     * PRETTY Mendez MD - Primary    Assisting Surgeon: None    Pre-op Diagnosis:  Sepsis [A41.9]    Post-op Diagnosis:  Post-Op Diagnosis Codes:     * Sepsis [A41.9]    Procedure(s) (LRB):  CYSTOSCOPY, WITH URETERAL STENT INSERTION Right     Anesthesia: Choice    Description of Procedure: Purulence drained once stent placed    Description of the findings of the procedure: as above    Estimated Blood Loss: * No values recorded between 7/28/2019 10:43 AM and 7/28/2019 10:51 AM *         Specimens:   Specimen (12h ago, onward)    None

## 2019-07-28 NOTE — PLAN OF CARE
Problem: Adult Inpatient Plan of Care  Goal: Plan of Care Review  Outcome: Ongoing (interventions implemented as appropriate)  Plan of care reviewed. No falls/injuries this shift. Skin intact. Cysto completed stent placed. Urine output improved. Blood sugar monitored. Unable to extubate post procedure, remains on vent, pulm consulted. Restless at times. Orientation provided. Family updated. NPO. Precautions dc'd. Weaning levophed as tolerated blood pressure labile.

## 2019-07-28 NOTE — PLAN OF CARE
Problem: Adult Inpatient Plan of Care  Goal: Plan of Care Review  Outcome: Ongoing (interventions implemented as appropriate)  Admitted to ICU via ED on last night for sepsis. Currently has Levo infusing for blood pressure support. Sosa to gravity noted w/ decreased urine output, decreased to 10 ml from 2103-5625, Dr. Hanna notified, no new orders received. Magnesium replaced, orders for Phosphorus replacement received. Urology consult pending for renal stone seen on CT. Cardiology consult pending for Troponin doubled, Dr. Hanna aware of Troponin doubling. IR consult pending for LP due to back surgery w/ hardware in place. Patient w/ c/o pain to right shoulder, PRN Tramadol and Tylenol given. Patient is pale  W/ cold and clammy skin. NS infusing at 150 ml/hour. Remains free of falls and injuries.

## 2019-07-28 NOTE — PLAN OF CARE
Problem: Adult Inpatient Plan of Care  Goal: Patient-Specific Goal (Individualization)  Outcome: Ongoing (interventions implemented as appropriate)  Pt remains on mechanical ventilation PRVC 22, vt 450, +5, 30%. 7.0 ETT 22 at the lip. Large to moderate white/cloudy secretions tracheal suctioned. AMBU and mask at the [facility]. Continue mecahnical ventilation as ordered. Will continue to monitor. CED Gagnon, RRT

## 2019-07-28 NOTE — SUBJECTIVE & OBJECTIVE
Past Medical History:   Diagnosis Date    Diabetes mellitus     Diverticulitis     Hypertension     Thyroid disease        Past Surgical History:   Procedure Laterality Date    BACK SURGERY      BLADDER SUSPENSION      CATARACT EXTRACTION, BILATERAL      HYSTERECTOMY         Review of patient's allergies indicates:  No Known Allergies    Family History     None          Tobacco Use    Smoking status: Never Smoker   Substance and Sexual Activity    Alcohol use: Never     Frequency: Never    Drug use: Never    Sexual activity: Not on file       Review of Systems   Constitutional: Positive for chills and fever.   HENT: Negative.    Eyes: Negative.    Respiratory: Negative for cough, chest tightness and shortness of breath.    Cardiovascular: Negative for chest pain.   Gastrointestinal: Negative.  Negative for constipation, diarrhea and nausea.   Genitourinary: Positive for flank pain and frequency.   Neurological: Positive for headaches.   Psychiatric/Behavioral: Negative.        Objective:     Temp:  [98.2 °F (36.8 °C)-103.1 °F (39.5 °C)] 98.5 °F (36.9 °C)  Pulse:  [] 88  Resp:  [15-40] 20  SpO2:  [88 %-100 %] 99 %  BP: ()/(44-80) 116/59     Body mass index is 32.82 kg/m².    Date 07/28/19 0700 - 07/29/19 0659   Shift 9760-6696 0505-6270 8184-9275 24 Hour Total   INTAKE   I.V.(mL/kg) 603.7(7.7)   603.7(7.7)   Shift Total(mL/kg) 603.7(7.7)   603.7(7.7)   OUTPUT   Urine(mL/kg/hr) 130   130   Shift Total(mL/kg) 130(1.6)   130(1.6)   Weight (kg) 78.8 78.8 78.8 78.8          Drains     Drain                 Urethral Catheter 07/28/19 0100 Non-latex less than 1 day                Physical Exam   Nursing note and vitals reviewed.  Constitutional: She is oriented to person, place, and time. She appears well-developed.   HENT:   Head: Normocephalic.   Eyes: Conjunctivae are normal.   Neck: Normal range of motion. No tracheal deviation present. No thyromegaly present.   Cardiovascular: Normal rate,  normal heart sounds and normal pulses.    Pulmonary/Chest: Effort normal and breath sounds normal. No respiratory distress. She has no wheezes.   Abdominal: Soft. She exhibits no distension and no mass. There is no hepatosplenomegaly. There is no tenderness. There is CVA tenderness. There is no rebound and no guarding. No hernia.   Musculoskeletal: Normal range of motion. She exhibits no edema or tenderness.   Lymphadenopathy:     She has no cervical adenopathy.   Neurological: She is alert and oriented to person, place, and time.   Skin: Skin is warm and dry. No rash noted. No erythema.     Psychiatric: She has a normal mood and affect. Her behavior is normal. Judgment and thought content normal.       Significant Labs:    BMP:  Recent Labs   Lab 07/27/19 1931 07/28/19  0310    135*   K 3.7 3.9    105   CO2 20* 21*   BUN 27* 29*   CREATININE 1.8* 2.4*   CALCIUM 9.6 8.2*       CBC:  Recent Labs   Lab 07/27/19 1931 07/28/19  0310   WBC 1.07* 12.09   HGB 11.3* 9.6*   HCT 35.7* 30.5*   * 139*       Blood Culture:   Recent Labs   Lab 07/27/19 1931 07/27/19  1935   LABBLOO Gram stain nilsa bottle:Gram negative rods  Gram stain aer bottle: Gram negative rods  Results called to and read back by:ICU BRENNEN ROGER   07/28/2019  07:37 Gram stain aer bottle: Gram negative rods  Gram stain nilsa bottle: Gram negative rods  Results called to and read back by: ICU [facility] 07/28/2019  07:40     Urine Culture: No results for input(s): LABURIN in the last 168 hours.    Significant Imaging:  CT: I have reviewed all results within the past 24 hours and my personal findings are:  Right hydronephrosis with stranding, right renal pelvic stone and right ureteral stone

## 2019-07-28 NOTE — ED NOTES
Md at bedside discussing lumbr puncture with pt and sister- sister (Shruti Castro) signed consent at this time

## 2019-07-28 NOTE — PROGRESS NOTES
[id number] faxed request for medical records to MD Garrett Kwong cardiologist at [facility] per orders from MD [place].

## 2019-07-28 NOTE — ASSESSMENT & PLAN NOTE
Significant troponin elevation in a patient with multiple risk factors for coronary artery disease, known nonobstructive coronary artery disease in the setting of acute renal failure and septic shock, in the absence of reported chest pain.    Continue to monitor closely.  Get records from Cardiology.  Start dual antiplatelet therapy when able  Check 2D echocardiogram to rule out large wall motion abnormalities.  Currently patient in septic shock and acute renal failure, once these issues resolve, will consider coronary angiogram

## 2019-07-28 NOTE — ED PROVIDER NOTES
Encounter Date: 7/27/2019    SCRIBE #1 NOTE: I, Delgadonickwilder Barrera, am scribing for, and in the presence of,  Bella Magaña MD. I have scribed the following portions of the note - Other sections scribed: HPI, PHIL.       History     Chief Complaint   Patient presents with    Fever     subjective fever at home onset today    Fatigue     68 y.o. female with PMHx of HTN, DM, and Diverticulitis presents to the ED via EMS with complaints of fever and associated lower abdominal pain, posterior HA, myalgia, dysuria, cough, and generalized weakness beginning 3 days ago, worsening this morning. Per EMS she was found lethargic at home, with blood glucose of 178 mmHg, blood pressure of 130s/ 80s and was 92% on room air, being placed on 1 liter of oxygen by EMS. NKDA.             Review of patient's allergies indicates:  No Known Allergies  Past Medical History:   Diagnosis Date    Diabetes mellitus     Diverticulitis     Hypertension     Thyroid disease      Past Surgical History:   Procedure Laterality Date    BACK SURGERY      BLADDER SUSPENSION      CATARACT EXTRACTION, BILATERAL      HYSTERECTOMY       History reviewed. No pertinent family history.  Social History     Tobacco Use    Smoking status: Never Smoker   Substance Use Topics    Alcohol use: Never     Frequency: Never    Drug use: Never     Review of Systems   Constitutional: Positive for fever.   HENT: Negative for sore throat.    Respiratory: Positive for cough. Negative for shortness of breath.    Cardiovascular: Negative for chest pain.   Gastrointestinal: Positive for abdominal pain. Negative for nausea and vomiting.   Genitourinary: Positive for dysuria.   Musculoskeletal: Positive for myalgias and neck pain. Negative for back pain.   Skin: Negative for rash.   Neurological: Positive for weakness and headaches.   Hematological: Does not bruise/bleed easily.       Physical Exam     Initial Vitals [07/27/19 1902]   BP Pulse Resp Temp SpO2   (!)  "146/68 (!) 155 (!) 40 (!) 103.1 °F (39.5 °C) 96 %      MAP       --         Physical Exam    Constitutional: She appears well-developed and well-nourished. She is not diaphoretic.   HENT:   Mucous membranes are dry   Eyes: Conjunctivae and EOM are normal. Pupils are equal, round, and reactive to light.   Neck: Neck supple. No Brudzinski's sign and no Kernig's sign noted.   Cardiovascular: Regular rhythm. Tachycardia present.    Pulmonary/Chest: Breath sounds normal. No respiratory distress. She has no wheezes. She has no rhonchi. She has no rales.   Abdominal: Soft. Bowel sounds are normal. She exhibits no distension. There is tenderness (Suprapubic tenderness). There is no guarding.   Neurological: She is alert. She has normal strength. No cranial nerve deficit or sensory deficit. GCS eye subscore is 4. GCS verbal subscore is 4. GCS motor subscore is 6.   Oriented to person and time, not oriented to place   Skin: Skin is warm and dry.         ED Course   Central Line  Date/Time: 7/27/2019 10:23 PM  Performed by: Bella Magaña MD  Consent Done: Yes  Time out: Immediately prior to procedure a "time out" was called to verify the correct patient, procedure, equipment, support staff and site/side marked as required.  Indications: med administration and vascular access  Anesthesia: local infiltration    Anesthesia:  Local Anesthetic: lidocaine 1% without epinephrine  Anesthetic total: 3 mL  Preparation: skin prepped with ChloraPrep  Skin prep agent dried: skin prep agent completely dried prior to procedure  Sterile barriers: all five maximum sterile barriers used - cap, mask, sterile gown, sterile gloves, and large sterile sheet  Hand hygiene: hand hygiene performed prior to central venous catheter insertion  Location details: right internal jugular  Catheter type: triple lumen  Catheter size: 7 Fr  Catheter Length: 10cm    Ultrasound guidance: yes  Vessel Caliber: largeNeedle advanced into vessel with real time " Ultrasound guidance.  Guidewire confirmed in vessel.  Sterile sheath used.  Number of attempts: 2  Post-procedure: line sutured,  chlorhexidine patch,  sterile dressing applied and blood return through all ports    Critical Care  Date/Time: 7/27/2019 11:47 PM  Performed by: Bella Magaña MD  Authorized by: Bella Magaña MD   Direct patient critical care time: 50 minutes  Additional history critical care time: 10 minutes  Documentation critical care time: 10 minutes  Total critical care time (exclusive of procedural time) : 70 minutes  Critical care was necessary to treat or prevent imminent or life-threatening deterioration of the following conditions: circulatory failure, shock and sepsis.  Critical care was time spent personally by me on the following activities: development of treatment plan with patient or surrogate, evaluation of patient's response to treatment, examination of patient, obtaining history from patient or surrogate, ordering and performing treatments and interventions, ordering and review of laboratory studies, ordering and review of radiographic studies, pulse oximetry, re-evaluation of patient's condition and vascular access procedures.        Labs Reviewed   CBC W/ AUTO DIFFERENTIAL - Abnormal; Notable for the following components:       Result Value    WBC 1.07 (*)     RBC 3.80 (*)     Hemoglobin 11.3 (*)     Hematocrit 35.7 (*)     Mean Corpuscular Hemoglobin Conc 31.7 (*)     Platelets 127 (*)     All other components within normal limits    Narrative:      WBC  critical result(s) called and verbal readback obtained from   Dia Lima, 07/27/2019 20:25   COMPREHENSIVE METABOLIC PANEL - Abnormal; Notable for the following components:    CO2 20 (*)     Glucose 165 (*)     BUN, Bld 27 (*)     Creatinine 1.8 (*)     Albumin 3.4 (*)     Total Bilirubin 1.1 (*)     eGFR if  33 (*)     eGFR if non  29 (*)     All other components within normal limits    LACTIC ACID, PLASMA - Abnormal; Notable for the following components:    Lactate (Lactic Acid) 4.7 (*)     All other components within normal limits    Narrative:     Lactic Acid critical result(s) called and verbal readback obtained   from Dia [place] , 07/27/2019 20:10   URINALYSIS, REFLEX TO URINE CULTURE - Abnormal; Notable for the following components:    Glucose, UA 1+ (*)     All other components within normal limits    Narrative:     Preferred Collection Type->Urine, Clean Catch   LACTIC ACID, PLASMA - Abnormal; Notable for the following components:    Lactate (Lactic Acid) 4.3 (*)     All other components within normal limits    Narrative:     Lactic Acid critical result(s) called and verbal readback obtained   from Krystyna Goldie , 07/27/2019 22:33   ALCOHOL,MEDICAL (ETHANOL)   DRUG SCREEN PANEL, URINE EMERGENCY    Narrative:     Preferred Collection Type->Urine, Clean Catch   POCT INFLUENZA A/B   POCT GLUCOSE MONITORING CONTINUOUS     EKG Readings: (Independently Interpreted)   Repeat ECG @ 23:23:  Sinus tachycardia, rate 119 beats per minute, no STEMI.       ECG Results          EKG 12-lead (Preliminary result)  Result time 07/27/19 19:28:48    ED Interpretation by Bella Magaña MD (07/27/19 19:28:48)    Tachycardia, rate 148 beats per minute. SVT versus sinus tachycardia, P waves appear buried in the T-waves.  No STEMI.  QRS duration 70 milliseconds.                            Imaging Results          X-Ray Chest AP Portable (Final result)  Result time 07/27/19 23:09:16    Final result by Teddy Meehan MD (07/27/19 23:09:16)                 Impression:      Right-sided internal jugular access catheter tip in the region the right brachiocephalic vein.  No immediate complication.    No acute intrathoracic process.      Electronically signed by: Teddy Meehan MD  Date:    07/27/2019  Time:    23:09             Narrative:    EXAMINATION:  XR CHEST AP PORTABLE    CLINICAL HISTORY:  Encounter for  adjustment and management of vascular access device    TECHNIQUE:  Single frontal view of the chest was performed.    COMPARISON:  07/27/2019.    FINDINGS:  There is a new right-sided internal jugular access catheter with its tip in the region of the right brachiocephalic vein.  Monitoring EKG leads are present.    The trachea is unremarkable.  The cardiomediastinal silhouette is within normal limits.  The hemidiaphragms are unremarkable.  There is no evidence of free air beneath the hemidiaphragms.  There are no pleural effusions.  There is no evidence of a pneumothorax.  There is no evidence of pneumomediastinum.  No airspace opacity is present.  There are degenerative changes in the osseous structures.                               CT Abdomen Pelvis  Without Contrast (Final result)  Result time 07/27/19 22:05:18   Procedure changed from CT Abdomen Pelvis With Contrast     Final result by Angeles Ward MD (07/27/19 22:05:18)                 Impression:      4 mm right proximal ureteral calculus with associated mild hydronephrosis and moderate perinephric infiltration.  4 mm nonobstructing pelvic calculus.    Hepatosplenomegaly.  Hepatic steatosis.    Distal descending colon and sigmoid colon diverticulosis without evidence of diverticulitis.      Electronically signed by: Angeles Ward  Date:    07/27/2019  Time:    22:05             Narrative:    EXAMINATION:  CT ABDOMEN PELVIS WITHOUT    CLINICAL HISTORY:  Abdominal pain.    TECHNIQUE:  5 mm unenhanced axial images from the lung bases through the greater trochanters were performed.  Coronal and sagittal reformatted images were provided.    COMPARISON:  None.    FINDINGS:  Within the limits of a noncontrast examination, the pancreas and adrenal glands are unremarkable.  The gallbladder contains no calcified gallstones.    The liver is fatty and enlarged.  Few calcifications are seen in the liver, which may be granulomas.  The spleen is mildly  enlarged.    Moderate perinephric stranding is present.  There is thickening of Gerota's fascia.  There is a 4 mm right proximal ureteral calculus with associated mild hydronephrosis.  There is a 4 mm calculus in the right renal pelvis.  There are no left intrarenal calculi or hydronephrosis.    There is no gross abdominal adenopathy or ascites.  Mild vascular calcification is present.    There is diverticulosis of the distal descending colon and sigmoid colon.  There is no diverticulitis.  There are no pelvic masses or adenopathy.  There is no free pelvic fluid.  The uterus is surgically absent.    At the lung bases, there is moderate bibasilar atelectatic change.  Interatrial lipomatosis is present.  There is a 7 mm right cardiophrenic angle lymph node.  There is asymmetric elevation of the right hemidiaphragm.    There is posterior lumbar fusion at L4/L5 with an intervertebral disc spacer and right hemilaminectomy.  There are bridging right-sided thoracic vertebral bodies with relative maintenance of the intervertebral disc spaces.  Consider diffuse idiopathic skeletal hyperostosis.                               CT Head Without Contrast (Final result)  Result time 07/27/19 20:26:23    Final result by Teddy Meehan MD (07/27/19 20:26:23)                 Impression:      No acute intracranial process.  Additional evaluation, as clinically warranted.    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      Electronically signed by: Teddy Meehan MD  Date:    07/27/2019  Time:    20:26             Narrative:    EXAMINATION:  CT HEAD WITHOUT CONTRAST    CLINICAL HISTORY:  Confusion/delirium, altered LOC, unexplained;    TECHNIQUE:  Low dose axial images were obtained through the head.  Coronal and sagittal reformations were also performed. Contrast was not administered.    COMPARISON:  None.    FINDINGS:  The subcutaneous tissues are unremarkable.  The bony calvarium is intact.  The paranasal sinuses are unremarkable.   The mastoid air cells are clear.  There are postoperative changes in the orbits.    The craniocervical junction is within normal limits.  The midline structures are unremarkable.  There are no extra-axial fluid collections.  There is no evidence of intracranial hemorrhage.  The ventricles and sulci are within normal limits.  The gray-white differentiation is maintained.  There are old lacunar type infarctions in the bilateral basal ganglia.  There is no dense vessel sign.  There is no evidence of mass effect.                               X-Ray Chest AP Portable (Final result)  Result time 07/27/19 20:20:07    Final result by Teddy Meehan MD (07/27/19 20:20:07)                 Impression:      No acute process.      Electronically signed by: Teddy Meehan MD  Date:    07/27/2019  Time:    20:20             Narrative:    EXAMINATION:  XR CHEST AP PORTABLE    CLINICAL HISTORY:  Sepsis;    TECHNIQUE:  Single frontal view of the chest was performed.    COMPARISON:  None    FINDINGS:  Monitoring EKG leads are present.  The trachea is unremarkable.  The cardiomediastinal silhouette is within normal limits.  There is elevation of the right hemidiaphragm.  The left hemidiaphragm is unremarkable.  There is no evidence of free air beneath the hemidiaphragms.  There are no pleural effusions.  There is no evidence of a pneumothorax.  There is no evidence of pneumomediastinum.  No airspace opacity is present.  There are degenerative changes in the osseous structures.                                 Medical Decision Making:   Initial Assessment:   68-year-old female presenting with fever, tachycardia, altered mental status.  She reports a posterior headache, dysuria, suprapubic abdominal tenderness. On exam, the patient is alert, GCS of 14, confused verbal response.  No focal neuro deficits.  Tenderness to palpation the suprapubic region, no rigidity or guarding.  Breath sounds are clear to auscultation bilaterally. Code sepsis  initiated due to patient's vital signs, altered mental status.  Treating with vanc and Zosyn, unclear source at this time.  Differential includes pyelonephritis, diverticulitis, pneumonia.  Viral illness.  Carefully considered meningitis, she does not have any meningeal signs on exam, feel this is less likely.  Clinical Tests:   Sepsis Perfusion Assessment: I attest, a sepsis perfusion exam was performed within 6 hours of Septic Shock presentation, following fluid resuscitation.  ED Management:  After returning from CT scan, patient hypotensive with a systolic in the 70s.  Levophed ordered and started 3 peripheral IV.  Patient consented for central line insertion, verbal consent obtained from patient, patient representative, her sister signed the written consent.    Update:  Patient on Levophed, mentation now improved and she is able to recount her surgical history which includes a L4-L5 fusion with hardware.  LP will not be performed as patient has hardware. She does have neprholithiasis on CT scan, some perinephric stranding. She denies any flank pain, she reported some suprapubic tenderness on exam and reports having urinary symptoms yesterday. UA negative for infection.     Update: Discussed case with Dr. Hanna for admission to ICU. Will continue Vanc/Zosyn.  Will place on neutropenic cautions as .  Shortly after admission, patient complained of chest pain.  Repeat EKG documented as above, no STEMI. Dr. Hanna notified, troponin added to work up.             Scribe attestation: I, Bella Magaña MD, personally performed the services described in this documentation. All medical record entries made by the scribe were at my direction and in my presence.  I have reviewed the chart and agree that the record reflects my personal performance and is accurate and complete.          ED Course as of Jul 28 0317   Sat Jul 27, 2019 1928 EKG shows sinus tachycardia versus SVT.  Based on patient's clinical appearance,  febrile, dehydrated, I will treat her with IV fluids and reassess.  P waves do appear very in T-waves on EKG.    [LH]      ED Course User Index  [LH] Bella Magaña MD     Clinical Impression:       ICD-10-CM ICD-9-CM   1. Sepsis, due to unspecified organism A41.9 038.9     995.91   2. Tachycardia R00.0 785.0   3. Encounter for central line placement Z45.2 V58.81   4. Nonintractable headache, unspecified chronicity pattern, unspecified headache type R51 784.0   5. Fever, unspecified fever cause R50.9 780.60   6. Other neutropenia D70.8 288.09   7. Chest pain R07.9 786.50   8. Nephrolithiasis N20.0 592.0                                Bella Magaña MD  07/28/19 0315

## 2019-07-28 NOTE — CARE UPDATE
[facility]  ICU Multidisciplinary Bedside Rounds   SUMMARY     Date: 7/28/2019    Prehospitalization: Home  Admit Date / LOS : 7/27/2019/ 1 days    Diagnosis: Severe sepsis with septic shock    Consults:        Active: IR and Urology, cardiology       Needed:      Code Status: Full Code   Advanced Directive: <no information>    LDA: PIV and TLC       Central Lines/Site/Justification:Pressors       Urinary Cath/Order/Justification:Chronic Indwelling Urinary Cathether on Admission    Vasopressors/Infusions:    sodium chloride 0.9% 150 mL/hr at 07/28/19 0200    norepinephrine bitartrate-D5W 0.3 mcg/kg/min (07/28/19 0200)          GOALS: Volume/ Hemodynamic: SBP >65                     RASS: N/A    CAM ICU: N/A  Pain Management: PO       Pain Controlled: yes     Rhythm: ST    Respiratory Device: Nasal Cannula                  Most Recent SBT/ SAT: N/A         VTE Prophylaxis: Mechanical  Mobility: Bedrest  Stress Ulcer Prophylaxis: Yes    Dietary: PO  Tolerance: yes  /  Advancement: @ goal    Isolation: Neutropenic    Restraints: No    Significant Dates:  Post Op Date: N/A  Rescue Date: N/A  Imaging/ Diagnostics: N/A    Noteworthy Labs:  BUN 29, CREAT 2.4, PHOS 2.4, TROP 0.739    CBC/Anemia Labs: Coags:    Recent Labs   Lab 07/27/19 1931   WBC 1.07*   HGB 11.3*   HCT 35.7*   *   MCV 94   RDW 14.1    No results for input(s): PT, INR, APTT in the last 168 hours.     Chemistries:   Recent Labs   Lab 07/27/19 1931      K 3.7      CO2 20*   BUN 27*   CREATININE 1.8*   CALCIUM 9.6   PROT 6.3   BILITOT 1.1*   ALKPHOS 73   ALT 25   AST 20        Cardiac Enzymes: Ejection Fractions:    Recent Labs     07/27/19 1931   TROPONINI 0.081*    No results found for: EF     POCT Glucose: HbA1c:    Recent Labs   Lab 07/27/19 1924 07/27/19 2253   POCTGLUCOSE 177* 118*    No results found for: HGBA1C     Needs from Care Team: none     ICU LOS 3h  Level of Care: Critical Care

## 2019-07-28 NOTE — ASSESSMENT & PLAN NOTE
We do not have previous lab work to which compared to chronicity of her renal failure but it is presumed to be acute in chronicity.  She is on metformin at home which suggests she has normal renal function.  Patient's urinalysis is significant for a specific gravity of 1.015 and 1+ glucose; it is otherwise benign.  Urine output has been fair.  Will obtain additional urine studies; provide aggressive IV fluid hydration; monitor the urine output; recheck the renal function in the morning; and avoid nephrotoxins.  Will obtain bilateral renal ultrasound, intact PTH, and vitamin-D.

## 2019-07-28 NOTE — HPI
Ms. Dasia Abreu is a 68 y.o. female from [place] with essential hypertension, type 2 diabetes mellitus (HbA1c unknown), CAD, and anemia chronic disease who presents to [facility] ED with complaints of diarrhea starting five days ago.  Her elderly mother lives with her in [place] and was driven into town five days ago to attend an appointment with her doctors.  Starting that they she started to watery stools that had resolved by the 2nd day.  Her friend recently bought a new car and she and her group of friends took the car to the [facility].  She started to feel very tired and lethargic and had chills once she arrived home.  She also complained of lower abdominal pain that started that day that was associated with urinary frequency; she denies any dysuria, hematuria, nor any urinary urgency.  Abdominal pain was nonradiating and was 8/10 in severity at its worst.  She also denies any chest pain, shortness of breath, palpitations, nor any diaphoresis.  She also reports right shoulder/right lower neck pain starting around the same time and started have a headache starting yesterday.  She denies any blurry vision.  She also denies any rashes nor joint pain. She has otherwise been in her usual state of health.

## 2019-07-28 NOTE — ASSESSMENT & PLAN NOTE
gnr from blood culture.  Most likely urosepsis.  S/p ureteral stenting.  Appear to have adequate source control aeb lessening pressor requirement.  Currently with zosyn/vanco.  Should consider stopping vanco.

## 2019-07-28 NOTE — ANESTHESIA PROCEDURE NOTES
Arterial    Diagnosis: sepsis    Patient location during procedure: done in OR  Procedure start time: 7/28/2019 10:06 AM  Timeout: 7/28/2019 10:06 AM  Procedure end time: 7/28/2019 10:15 AM    Staffing  Authorizing Provider: Erasmo Garcia MD  Performing Provider: Erasmo Garcia MD    Anesthesiologist was present at the time of the procedure.    Preanesthetic Checklist  Completed: patient identified, site marked, pre-op evaluation, timeout performed, IV checked, risks and benefits discussed, monitors and equipment checked and anesthesia consent givenArterial  Skin Prep: chlorhexidine gluconate  Local Infiltration: lidocaine  Orientation: right  Location: radial  Catheter Size: 20 G  Catheter placement by Anatomical landmarks and Ultrasound guidance. Heme positive aspiration all ports.  Vessel Caliber: patent  Needle advanced into vessel with real time Ultrasound guidance.Insertion Attempts: 1  Assessment  Dressing: secured with tape and tegaderm (with biopatch)  Patient: Tolerated well  Additional Notes  ultraxound used

## 2019-07-28 NOTE — ED NOTES
Pt friend at bedside at time of admission. Pt does not wish to have anyone contacted regarding admission

## 2019-07-28 NOTE — HPI
Urolithiasis  Patient complains of right abdominal pain with radiation to the abdomen. Onset of symptoms was abrupt starting 2 days ago with unchanged course since that time. Patient describes the pain as aching, intermittent and rated as moderate. The patient has had nausea and vomiting and diaphoresis. There has been fever and chills. The patient is not complaining of dysuria or frequency. Risk factors for urolithiasis: none.

## 2019-07-28 NOTE — ED TRIAGE NOTES
Pt presents to  ED via EMS with complaints of fever and associated lower abdominal pain, posterior HA, myalgia, dysuria, cough, and generalized weakness beginning 3 days ago. Upon arrival to ED, pt very lethargic which makes intake difficult. Pt sister at bedside and able to give some insight on pt PMH, but it is still limited. Pt sister reports pt is visiting from out of town and has not felt well and has been weak and lethargic x3 days, which worsened today prompting her to call EMS

## 2019-07-28 NOTE — SUBJECTIVE & OBJECTIVE
Past Medical History:   Diagnosis Date    Diabetes mellitus     Diverticulitis     Hypertension     Thyroid disease        Past Surgical History:   Procedure Laterality Date    BACK SURGERY      BLADDER SUSPENSION      CATARACT EXTRACTION, BILATERAL      HYSTERECTOMY         Review of patient's allergies indicates:  No Known Allergies    Family History     None        Tobacco Use    Smoking status: Never Smoker   Substance and Sexual Activity    Alcohol use: Never     Frequency: Never    Drug use: Never    Sexual activity: Not on file         Review of Systems   Unable to perform ROS: Intubated     Objective:     Vital Signs (Most Recent):  Temp: 97.8 °F (36.6 °C) (07/28/19 1145)  Pulse: 85 (07/28/19 1513)  Resp: (!) 23 (07/28/19 1513)  BP: (!) 150/57 (07/28/19 1505)  SpO2: 100 % (07/28/19 1513) Vital Signs (24h Range):  Temp:  [97.7 °F (36.5 °C)-103.1 °F (39.5 °C)] 97.8 °F (36.6 °C)  Pulse:  [] 85  Resp:  [12-40] 23  SpO2:  [88 %-100 %] 100 %  BP: ()/() 150/57  Arterial Line BP: ()/(43-92) 155/68     Weight: 83.7 kg (184 lb 8.4 oz)  Body mass index is 34.87 kg/m².      Intake/Output Summary (Last 24 hours) at 7/28/2019 1627  Last data filed at 7/28/2019 1416  Gross per 24 hour   Intake 6431.11 ml   Output 785 ml   Net 5646.11 ml       Physical Exam   Constitutional: She appears well-developed.   HENT:   Head: Normocephalic and atraumatic.   Nose: Nose normal.   Mouth/Throat: Oropharynx is clear and moist.   Eyes: Pupils are equal, round, and reactive to light. EOM are normal.   Neck: Normal range of motion.   Cardiovascular: Normal rate, regular rhythm and normal heart sounds.   Pulmonary/Chest: Effort normal and breath sounds normal. No respiratory distress. She has no wheezes. She has no rales. She exhibits no tenderness.   Abdominal: Soft. Bowel sounds are normal. She exhibits no mass. There is no rebound and no guarding.   Genitourinary:   Genitourinary Comments: Sosa in  place.  +bloody urine.     Musculoskeletal: She exhibits no edema or tenderness.   Neurological: She is alert. No cranial nerve deficit. Coordination normal.   Follows command.     Skin: No rash noted. No erythema. No pallor.   Psychiatric: She has a normal mood and affect. Her behavior is normal.       Vents:  Vent Mode: SIMV (PC) + PS (07/28/19 1513)  Ventilator Initiated: Yes (07/28/19 1137)  Set Rate: 22 bmp (07/28/19 1513)  Vt Set: 450 mL (07/28/19 1513)  PEEP/CPAP: 5 cmH20 (07/28/19 1513)  Oxygen Concentration (%): 40 (07/28/19 1513)  Peak Airway Pressure: 26.1 cmH2O (07/28/19 1513)  Total Ve: 10.2 mL (07/28/19 1513)  F/VT Ratio<105 (RSBI): (!) 49.68 (07/28/19 1513)    Lines/Drains/Airways     Central Venous Catheter Line                 Percutaneous Central Line Insertion/Assessment - triple lumen  07/27/19 2220 right internal jugular less than 1 day          Drain                 Urethral Catheter 07/28/19 0100 Non-latex less than 1 day          Airway                 Airway - Non-Surgical 07/28/19 1035 Endotracheal Tube less than 1 day          Arterial Line                 Arterial Line 07/28/19 1006 Right Radial less than 1 day          Peripheral Intravenous Line                 Peripheral IV - Single Lumen 07/27/19 20 G Right Upper Arm 1 day                Significant Labs:    CBC/Anemia Profile:  Recent Labs   Lab 07/27/19 1931 07/28/19 0310 07/28/19  1153   WBC 1.07* 12.09 21.83*   HGB 11.3* 9.6* 10.1*   HCT 35.7* 30.5* 32.4*   * 139* 140*   MCV 94 94 96   RDW 14.1 14.3 14.5        Chemistries:  Recent Labs   Lab 07/27/19 1931 07/28/19 0310 07/28/19  1153    135* 137   K 3.7 3.9 4.3    105 107   CO2 20* 21* 13*   BUN 27* 29* 30*   CREATININE 1.8* 2.4* 2.2*   CALCIUM 9.6 8.2* 8.2*   ALBUMIN 3.4* 2.7* 2.9*   PROT 6.3 5.2* 6.2   BILITOT 1.1* 0.8 0.7   ALKPHOS 73 43* 50*   ALT 25 18 24   AST 20 24 32   MG  --  0.9*  --    PHOS  --  2.4*  --        ABGs:   Recent Labs   Lab  07/28/19  1316   PH 7.254*   PCO2 32.7*   HCO3 14.5*   POCSATURATED 97   BE -12     Echo 7/28/19  · Normal left ventricular systolic function. The estimated ejection fraction is 65%  · Concentric left ventricular remodeling.  · Normal LV diastolic function.  · Normal right ventricular systolic function.  · Mild left atrial enlargement.  · Mild mitral regurgitation.  · Mild tricuspid regurgitation.  · Normal central venous pressure (3 mm Hg).  · The estimated PA systolic pressure is 12 mm     Significant Imaging:   CXR: I have reviewed all pertinent results/findings within the past 24 hours and my personal findings are:  7/28/19 bilateral reticular markings.  appear worse when compared to 7/27/19 cxr.

## 2019-07-28 NOTE — CONSULTS
[facility]  Cardiology  Consult Note    Patient Name: Dasia Abreu  MRN: [id number]  Admission Date: 7/27/2019  Hospital Length of Stay: 1 days  Code Status: Full Code   Attending Provider: Ministerio Crenshaw MD   Consulting Provider: Stephani Lofton MD  Primary Care Physician: Primary Doctor No  Principal Problem:Severe sepsis with septic shock    Patient information was obtained from past medical records, Nurse and ER records.     Inpatient consult to Cardiology  Consult performed by: Stephani Lofton MD  Consult ordered by: Frank Hanna MD        Subjective:     Chief Complaint:  Troponin elevation, septic shock     HPI:   Ms. Dasia Abreu is a 68 y.o. female from [place] with essential hypertension, type 2 diabetes mellitus (HbA1c unknown), CAD, and anemia chronic disease who presents to [facility] ED with complaints of diarrhea starting five days ago.  Her elderly mother lives with her in [place] and was driven into town five days ago to attend an appointment with her doctors.  Starting that they she started to watery stools that had resolved by the 2nd day.  Her friend recently bought a new car and she and her group of friends took the car to the [facility].  She started to feel very tired and lethargic and had chills once she arrived home.  She also complained of lower abdominal pain that started that day that was associated with urinary frequency; she denies any dysuria, hematuria, nor any urinary urgency.  Abdominal pain was nonradiating and was 8/10 in severity at its worst.  She also denies any chest pain, shortness of breath, palpitations, nor any diaphoresis.  She also reports right shoulder/right lower neck pain starting around the same time and started have a headache starting yesterday.  She denies any blurry vision.  She also denies any rashes nor joint pain. She has otherwise been in her usual state of health.      Currently on Levophed drip.  Underwent right ureteral stent today  morning.  Troponin elevated at 0.7.  Last EKG shows sinus tachycardia with nonspecific ST changes.  No acute ST elevation changes noted.  Currently patient is intubated and all the history was obtained from the nurse as well as the patient chart.  The nurse states that the patient said that she had been complaining of worsening dyspnea on exertion.  The nurse also stated that the patient told her that she had undergone angiogram in the past and had a blockage, but nothing which required stents.  She also complained of right shoulder pain, denied chest pain.  In surgery she complained of stabbing back pain prior to intubation as per the surgical staff.  She is visiting from [place] and apparently follows with a cardiologist in [place].          Past Medical History:   Diagnosis Date    Diabetes mellitus     Diverticulitis     Hypertension     Thyroid disease        Past Surgical History:   Procedure Laterality Date    BACK SURGERY      BLADDER SUSPENSION      CATARACT EXTRACTION, BILATERAL      HYSTERECTOMY         Review of patient's allergies indicates:  No Known Allergies    No current facility-administered medications on file prior to encounter.      No current outpatient medications on file prior to encounter.     Family History     None        Tobacco Use    Smoking status: Never Smoker   Substance and Sexual Activity    Alcohol use: Never     Frequency: Never    Drug use: Never    Sexual activity: Not on file     Review of Systems   Unable to perform ROS: intubated     Objective:     Vital Signs (Most Recent):  Temp: 98.5 °F (36.9 °C) (07/28/19 0815)  Pulse: 88 (07/28/19 1000)  Resp: (!) 21 (07/28/19 1000)  BP: (!) 128/56 (07/28/19 1000)  SpO2: 98 % (07/28/19 1000) Vital Signs (24h Range):  Temp:  [98.2 °F (36.8 °C)-103.1 °F (39.5 °C)] 98.5 °F (36.9 °C)  Pulse:  [] 88  Resp:  [15-40] 21  SpO2:  [88 %-100 %] 98 %  BP: ()/(44-80) 128/56     Weight: 83.7 kg (184 lb 8.4 oz)  Body  mass index is 34.87 kg/m².    SpO2: 98 %  O2 Device (Oxygen Therapy): nasal cannula      Intake/Output Summary (Last 24 hours) at 7/28/2019 1125  Last data filed at 7/28/2019 1000  Gross per 24 hour   Intake 5029.59 ml   Output 370 ml   Net 4659.59 ml       Lines/Drains/Airways     Central Venous Catheter Line                 Percutaneous Central Line Insertion/Assessment - triple lumen  07/27/19 2220 right internal jugular less than 1 day          Drain                 Urethral Catheter 07/28/19 0100 Non-latex less than 1 day          Airway                 Airway - Non-Surgical 07/28/19 1035 Endotracheal Tube less than 1 day          Arterial Line                 Arterial Line 07/28/19 1006 Right Radial less than 1 day          Peripheral Intravenous Line                 Peripheral IV - Single Lumen 07/27/19 20 G Right Upper Arm 1 day                Physical Exam   Constitutional: She is oriented to person, place, and time. She appears well-developed and well-nourished. She has a sickly appearance. She is intubated.   HENT:   Head: Normocephalic.   Eyes: Pupils are equal, round, and reactive to light.   Neck: Normal range of motion. Neck supple.   Cardiovascular: Regular rhythm. Tachycardia present.   Pulmonary/Chest: Effort normal and breath sounds normal. She is intubated.   Abdominal: Soft. Normal appearance and bowel sounds are normal. There is no tenderness.   Musculoskeletal: She exhibits no edema, tenderness or deformity.   Neurological: She is alert and oriented to person, place, and time.   Unable to perform as patient intubated   Skin: Skin is warm.   Psychiatric: She has a normal mood and affect.   Nursing note and vitals reviewed.      Significant Labs:   BMP:   Recent Labs   Lab 07/27/19 1931 07/28/19  0310   * 226*    135*   K 3.7 3.9    105   CO2 20* 21*   BUN 27* 29*   CREATININE 1.8* 2.4*   CALCIUM 9.6 8.2*   MG  --  0.9*   , CMP   Recent Labs   Lab 07/27/19 1931  07/28/19  0310    135*   K 3.7 3.9    105   CO2 20* 21*   * 226*   BUN 27* 29*   CREATININE 1.8* 2.4*   CALCIUM 9.6 8.2*   PROT 6.3 5.2*   ALBUMIN 3.4* 2.7*   BILITOT 1.1* 0.8   ALKPHOS 73 43*   AST 20 24   ALT 25 18   ANIONGAP 14 9   ESTGFRAFRICA 33* 23*   EGFRNONAA 29* 20*   , CBC   Recent Labs   Lab 07/27/19 1931 07/28/19  0310   WBC 1.07* 12.09   HGB 11.3* 9.6*   HCT 35.7* 30.5*   * 139*   , INR No results for input(s): INR, PROTIME in the last 48 hours., Lipid Panel No results for input(s): CHOL, HDL, LDLCALC, TRIG, CHOLHDL in the last 48 hours., Troponin   Recent Labs   Lab 07/27/19 1931 07/28/19 0310   TROPONINI 0.081* 0.739*    and All pertinent lab results from the last 24 hours have been reviewed.    Significant Imaging: EKG: Personally reviewed as above    Assessment and Plan:     * Severe sepsis with septic shock  Presenting complaint.  Management per primary    Elevated troponin  Significant troponin elevation in a patient with multiple risk factors for coronary artery disease, known nonobstructive coronary artery disease in the setting of acute renal failure and septic shock, in the absence of reported chest pain.    Continue to monitor closely.  Get records from Cardiology.  Start dual antiplatelet therapy when able  Check 2D echocardiogram to rule out large wall motion abnormalities.  Currently patient in septic shock and acute renal failure, once these issues resolve, will consider coronary angiogram    Coronary artery disease  As per chart, history of nonobstructive coronary artery disease.  Try to get records from patient's cardiologist    Type 2 diabetes mellitus  Management per primary    Essential hypertension  Hold all antihypertensives as patient currently in septic shock and requiring Levophed drip.        VTE Risk Mitigation (From admission, onward)        Ordered     IP VTE LOW RISK PATIENT  Once      07/28/19 0018          Thank you for your consult. I will  follow with the patient.    Stephani Lofton MD  Cardiology   [facility]      Critical care time 50 min

## 2019-07-28 NOTE — PROGRESS NOTES
1839 Called and spoke with MD mathews concerning order for renal ultrasound, new orders to cancel renal ultrasound.

## 2019-07-29 PROBLEM — E87.20 METABOLIC ACIDOSIS: Status: RESOLVED | Noted: 2019-07-28 | Resolved: 2019-07-29

## 2019-07-29 PROBLEM — E87.6 HYPOKALEMIA: Status: ACTIVE | Noted: 2019-07-29

## 2019-07-29 PROBLEM — J96.01 ACUTE RESPIRATORY FAILURE WITH HYPOXIA: Status: ACTIVE | Noted: 2019-07-28

## 2019-07-29 PROBLEM — D69.6 THROMBOCYTOPENIA, UNSPECIFIED: Status: ACTIVE | Noted: 2019-07-29

## 2019-07-29 PROBLEM — N20.1 URETERAL CALCULUS: Status: ACTIVE | Noted: 2019-07-29

## 2019-07-29 PROBLEM — N20.1 URETERAL CALCULUS: Status: RESOLVED | Noted: 2019-07-29 | Resolved: 2019-07-29

## 2019-07-29 LAB
ALBUMIN SERPL BCP-MCNC: 2.2 G/DL (ref 3.5–5.2)
ALLENS TEST: ABNORMAL
ALP SERPL-CCNC: 59 U/L (ref 55–135)
ALT SERPL W/O P-5'-P-CCNC: 27 U/L (ref 10–44)
ANION GAP SERPL CALC-SCNC: 13 MMOL/L (ref 8–16)
ANION GAP SERPL CALC-SCNC: 15 MMOL/L (ref 8–16)
AST SERPL-CCNC: 27 U/L (ref 10–40)
BACTERIA BLD CULT: ABNORMAL
BASOPHILS # BLD AUTO: 0.03 K/UL (ref 0–0.2)
BASOPHILS NFR BLD: 0 % (ref 0–1.9)
BASOPHILS NFR BLD: 0.3 % (ref 0–1.9)
BILIRUB SERPL-MCNC: 1 MG/DL (ref 0.1–1)
BUN SERPL-MCNC: 26 MG/DL (ref 8–23)
BUN SERPL-MCNC: 30 MG/DL (ref 8–23)
CALCIUM SERPL-MCNC: 7.8 MG/DL (ref 8.7–10.5)
CALCIUM SERPL-MCNC: 8 MG/DL (ref 8.7–10.5)
CHLORIDE SERPL-SCNC: 100 MMOL/L (ref 95–110)
CHLORIDE SERPL-SCNC: 104 MMOL/L (ref 95–110)
CO2 SERPL-SCNC: 20 MMOL/L (ref 23–29)
CO2 SERPL-SCNC: 26 MMOL/L (ref 23–29)
CREAT SERPL-MCNC: 1.6 MG/DL (ref 0.5–1.4)
CREAT SERPL-MCNC: 1.9 MG/DL (ref 0.5–1.4)
DELSYS: ABNORMAL
DIFFERENTIAL METHOD: ABNORMAL
DIFFERENTIAL METHOD: ABNORMAL
EOSINOPHIL # BLD AUTO: 0 K/UL (ref 0–0.5)
EOSINOPHIL NFR BLD: 0 % (ref 0–8)
EOSINOPHIL NFR BLD: 0.4 % (ref 0–8)
ERYTHROCYTE [DISTWIDTH] IN BLOOD BY AUTOMATED COUNT: 14.4 % (ref 11.5–14.5)
ERYTHROCYTE [DISTWIDTH] IN BLOOD BY AUTOMATED COUNT: 14.4 % (ref 11.5–14.5)
ERYTHROCYTE [SEDIMENTATION RATE] IN BLOOD BY WESTERGREN METHOD: 22 MM/H
EST. GFR  (AFRICAN AMERICAN): 31 ML/MIN/1.73 M^2
EST. GFR  (AFRICAN AMERICAN): 38 ML/MIN/1.73 M^2
EST. GFR  (NON AFRICAN AMERICAN): 27 ML/MIN/1.73 M^2
EST. GFR  (NON AFRICAN AMERICAN): 33 ML/MIN/1.73 M^2
FIO2: 30
GLUCOSE SERPL-MCNC: 332 MG/DL (ref 70–110)
GLUCOSE SERPL-MCNC: 353 MG/DL (ref 70–110)
HCO3 UR-SCNC: 22.2 MMOL/L (ref 24–28)
HCT VFR BLD AUTO: 26.6 % (ref 37–48.5)
HCT VFR BLD AUTO: 27.2 % (ref 37–48.5)
HGB BLD-MCNC: 8.8 G/DL (ref 12–16)
HGB BLD-MCNC: 9 G/DL (ref 12–16)
LACTATE SERPL-SCNC: 2 MMOL/L (ref 0.5–2.2)
LACTATE SERPL-SCNC: 3.7 MMOL/L (ref 0.5–2.2)
LYMPHOCYTES # BLD AUTO: 0.9 K/UL (ref 1–4.8)
LYMPHOCYTES NFR BLD: 2 % (ref 18–48)
LYMPHOCYTES NFR BLD: 9 % (ref 18–48)
MAGNESIUM SERPL-MCNC: 1.7 MG/DL (ref 1.6–2.6)
MAGNESIUM SERPL-MCNC: 1.7 MG/DL (ref 1.6–2.6)
MCH RBC QN AUTO: 30.1 PG (ref 27–31)
MCH RBC QN AUTO: 30.2 PG (ref 27–31)
MCHC RBC AUTO-ENTMCNC: 33.1 G/DL (ref 32–36)
MCHC RBC AUTO-ENTMCNC: 33.1 G/DL (ref 32–36)
MCV RBC AUTO: 91 FL (ref 82–98)
MCV RBC AUTO: 91 FL (ref 82–98)
METAMYELOCYTES NFR BLD MANUAL: 6 %
MIN VOL: 70.2
MODE: ABNORMAL
MONOCYTES # BLD AUTO: 0.4 K/UL (ref 0.3–1)
MONOCYTES NFR BLD: 1 % (ref 4–15)
MONOCYTES NFR BLD: 4.1 % (ref 4–15)
MYELOCYTES NFR BLD MANUAL: 5 %
NEUTROPHILS # BLD AUTO: 8.1 K/UL (ref 1.8–7.7)
NEUTROPHILS NFR BLD: 39 % (ref 38–73)
NEUTROPHILS NFR BLD: 90.9 % (ref 38–73)
NEUTS BAND NFR BLD MANUAL: 47 %
PCO2 BLDA: 26.9 MMHG (ref 35–45)
PEEP: 5
PH SMN: 7.53 [PH] (ref 7.35–7.45)
PHOSPHATE SERPL-MCNC: 1.4 MG/DL (ref 2.7–4.5)
PIP: 25
PLATELET # BLD AUTO: 66 K/UL (ref 150–350)
PLATELET # BLD AUTO: 71 K/UL (ref 150–350)
PLATELET BLD QL SMEAR: ABNORMAL
PMV BLD AUTO: 11 FL (ref 9.2–12.9)
PMV BLD AUTO: 11.2 FL (ref 9.2–12.9)
PO2 BLDA: 87 MMHG (ref 80–100)
POC BE: 0 MMOL/L
POC SATURATED O2: 98 % (ref 95–100)
POC TCO2: 23 MMOL/L (ref 23–27)
POCT GLUCOSE: 243 MG/DL (ref 70–110)
POCT GLUCOSE: 273 MG/DL (ref 70–110)
POCT GLUCOSE: 313 MG/DL (ref 70–110)
POCT GLUCOSE: 316 MG/DL (ref 70–110)
POCT GLUCOSE: 332 MG/DL (ref 70–110)
POTASSIUM SERPL-SCNC: 3.1 MMOL/L (ref 3.5–5.1)
POTASSIUM SERPL-SCNC: 3.7 MMOL/L (ref 3.5–5.1)
PROT SERPL-MCNC: 5.6 G/DL (ref 6–8.4)
PTH-INTACT SERPL-MCNC: 7.6 PG/ML (ref 9–77)
RBC # BLD AUTO: 2.91 M/UL (ref 4–5.4)
RBC # BLD AUTO: 2.99 M/UL (ref 4–5.4)
SAMPLE: ABNORMAL
SITE: ABNORMAL
SODIUM SERPL-SCNC: 139 MMOL/L (ref 136–145)
SODIUM SERPL-SCNC: 139 MMOL/L (ref 136–145)
SP02: 98
TOXIC GRANULES BLD QL SMEAR: PRESENT
TROPONIN I SERPL DL<=0.01 NG/ML-MCNC: 0.9 NG/ML (ref 0–0.03)
TROPONIN I SERPL DL<=0.01 NG/ML-MCNC: 1.17 NG/ML (ref 0–0.03)
VT: 450
WBC # BLD AUTO: 9.41 K/UL (ref 3.9–12.7)
WBC # BLD AUTO: 9.73 K/UL (ref 3.9–12.7)

## 2019-07-29 PROCEDURE — 83605 ASSAY OF LACTIC ACID: CPT | Mod: 91

## 2019-07-29 PROCEDURE — 83735 ASSAY OF MAGNESIUM: CPT | Mod: 91

## 2019-07-29 PROCEDURE — 84100 ASSAY OF PHOSPHORUS: CPT

## 2019-07-29 PROCEDURE — 27000221 HC OXYGEN, UP TO 24 HOURS

## 2019-07-29 PROCEDURE — 82803 BLOOD GASES ANY COMBINATION: CPT

## 2019-07-29 PROCEDURE — 87040 BLOOD CULTURE FOR BACTERIA: CPT

## 2019-07-29 PROCEDURE — 99900035 HC TECH TIME PER 15 MIN (STAT)

## 2019-07-29 PROCEDURE — 25000003 PHARM REV CODE 250: Performed by: HOSPITALIST

## 2019-07-29 PROCEDURE — 36415 COLL VENOUS BLD VENIPUNCTURE: CPT

## 2019-07-29 PROCEDURE — 63600175 PHARM REV CODE 636 W HCPCS: Performed by: UROLOGY

## 2019-07-29 PROCEDURE — 85027 COMPLETE CBC AUTOMATED: CPT

## 2019-07-29 PROCEDURE — S0028 INJECTION, FAMOTIDINE, 20 MG: HCPCS | Performed by: HOSPITALIST

## 2019-07-29 PROCEDURE — 99291 PR CRITICAL CARE, E/M 30-74 MINUTES: ICD-10-PCS | Mod: ,,, | Performed by: INTERNAL MEDICINE

## 2019-07-29 PROCEDURE — 37799 UNLISTED PX VASCULAR SURGERY: CPT

## 2019-07-29 PROCEDURE — 63600175 PHARM REV CODE 636 W HCPCS: Performed by: HOSPITALIST

## 2019-07-29 PROCEDURE — 99233 SBSQ HOSP IP/OBS HIGH 50: CPT | Mod: ,,, | Performed by: UROLOGY

## 2019-07-29 PROCEDURE — 25000003 PHARM REV CODE 250: Performed by: NURSE PRACTITIONER

## 2019-07-29 PROCEDURE — 99291 CRITICAL CARE FIRST HOUR: CPT | Mod: ,,, | Performed by: INTERNAL MEDICINE

## 2019-07-29 PROCEDURE — 63600175 PHARM REV CODE 636 W HCPCS: Performed by: ANESTHESIOLOGY

## 2019-07-29 PROCEDURE — 25000003 PHARM REV CODE 250: Performed by: UROLOGY

## 2019-07-29 PROCEDURE — 85025 COMPLETE CBC W/AUTO DIFF WBC: CPT

## 2019-07-29 PROCEDURE — 99233 PR SUBSEQUENT HOSPITAL CARE,LEVL III: ICD-10-PCS | Mod: ,,, | Performed by: UROLOGY

## 2019-07-29 PROCEDURE — 20000000 HC ICU ROOM

## 2019-07-29 PROCEDURE — 99291 CRITICAL CARE FIRST HOUR: CPT | Mod: ,,, | Performed by: NURSE PRACTITIONER

## 2019-07-29 PROCEDURE — 63600175 PHARM REV CODE 636 W HCPCS: Performed by: NURSE PRACTITIONER

## 2019-07-29 PROCEDURE — 83735 ASSAY OF MAGNESIUM: CPT

## 2019-07-29 PROCEDURE — 84484 ASSAY OF TROPONIN QUANT: CPT

## 2019-07-29 PROCEDURE — 80053 COMPREHEN METABOLIC PANEL: CPT

## 2019-07-29 PROCEDURE — 99900017 HC EXTUBATION W/PARAMETERS (STAT)

## 2019-07-29 PROCEDURE — 94761 N-INVAS EAR/PLS OXIMETRY MLT: CPT

## 2019-07-29 PROCEDURE — 99291 PR CRITICAL CARE, E/M 30-74 MINUTES: ICD-10-PCS | Mod: ,,, | Performed by: NURSE PRACTITIONER

## 2019-07-29 PROCEDURE — 85007 BL SMEAR W/DIFF WBC COUNT: CPT

## 2019-07-29 RX ORDER — ACETAMINOPHEN 325 MG/1
650 TABLET ORAL EVERY 4 HOURS PRN
Status: DISCONTINUED | OUTPATIENT
Start: 2019-07-29 | End: 2019-08-01 | Stop reason: HOSPADM

## 2019-07-29 RX ORDER — CETIRIZINE HYDROCHLORIDE 5 MG/1
5 TABLET ORAL DAILY
Status: DISCONTINUED | OUTPATIENT
Start: 2019-07-30 | End: 2019-08-01 | Stop reason: HOSPADM

## 2019-07-29 RX ORDER — PSEUDOEPHEDRINE HCL 30 MG
60 TABLET ORAL EVERY 6 HOURS PRN
Status: DISCONTINUED | OUTPATIENT
Start: 2019-07-29 | End: 2019-08-01 | Stop reason: HOSPADM

## 2019-07-29 RX ORDER — INSULIN ASPART 100 [IU]/ML
1-10 INJECTION, SOLUTION INTRAVENOUS; SUBCUTANEOUS EVERY 6 HOURS PRN
Status: DISCONTINUED | OUTPATIENT
Start: 2019-07-29 | End: 2019-08-01 | Stop reason: HOSPADM

## 2019-07-29 RX ORDER — TRAMADOL HYDROCHLORIDE 50 MG/1
50 TABLET ORAL EVERY 4 HOURS PRN
Status: DISCONTINUED | OUTPATIENT
Start: 2019-07-29 | End: 2019-08-01 | Stop reason: HOSPADM

## 2019-07-29 RX ADMIN — DEXMEDETOMIDINE HYDROCHLORIDE 1.2 MCG/KG/HR: 4 INJECTION INTRAVENOUS at 05:07

## 2019-07-29 RX ADMIN — PIPERACILLIN AND TAZOBACTAM 4.5 G: 4; .5 INJECTION, POWDER, LYOPHILIZED, FOR SOLUTION INTRAVENOUS; PARENTERAL at 04:07

## 2019-07-29 RX ADMIN — FAMOTIDINE 20 MG: 10 INJECTION INTRAVENOUS at 08:07

## 2019-07-29 RX ADMIN — PIPERACILLIN AND TAZOBACTAM 4.5 G: 4; .5 INJECTION, POWDER, LYOPHILIZED, FOR SOLUTION INTRAVENOUS; PARENTERAL at 12:07

## 2019-07-29 RX ADMIN — DEXMEDETOMIDINE HYDROCHLORIDE 1.2 MCG/KG/HR: 4 INJECTION INTRAVENOUS at 08:07

## 2019-07-29 RX ADMIN — INSULIN ASPART 6 UNITS: 100 INJECTION, SOLUTION INTRAVENOUS; SUBCUTANEOUS at 04:07

## 2019-07-29 RX ADMIN — FENTANYL CITRATE 12.5 MCG: 50 INJECTION, SOLUTION INTRAMUSCULAR; INTRAVENOUS at 01:07

## 2019-07-29 RX ADMIN — TRAMADOL HYDROCHLORIDE 50 MG: 50 TABLET, FILM COATED ORAL at 03:07

## 2019-07-29 RX ADMIN — ACETAMINOPHEN 650 MG: 325 TABLET, FILM COATED ORAL at 07:07

## 2019-07-29 RX ADMIN — INSULIN ASPART 2 UNITS: 100 INJECTION, SOLUTION INTRAVENOUS; SUBCUTANEOUS at 09:07

## 2019-07-29 RX ADMIN — NOREPINEPHRINE BITARTRATE 0.05 MCG/KG/MIN: 1 INJECTION INTRAVENOUS at 06:07

## 2019-07-29 RX ADMIN — INSULIN ASPART 4 UNITS: 100 INJECTION, SOLUTION INTRAVENOUS; SUBCUTANEOUS at 12:07

## 2019-07-29 RX ADMIN — CHLORHEXIDINE GLUCONATE 0.12% ORAL RINSE 15 ML: 1.2 LIQUID ORAL at 08:07

## 2019-07-29 RX ADMIN — ACETAMINOPHEN 500 MG: 500 TABLET, FILM COATED ORAL at 12:07

## 2019-07-29 RX ADMIN — DEXMEDETOMIDINE HYDROCHLORIDE 1.2 MCG/KG/HR: 4 INJECTION INTRAVENOUS at 12:07

## 2019-07-29 RX ADMIN — SODIUM PHOSPHATE, MONOBASIC, MONOHYDRATE 39.99 MMOL: 276; 142 INJECTION, SOLUTION INTRAVENOUS at 12:07

## 2019-07-29 RX ADMIN — RAMELTEON 8 MG: 8 TABLET, FILM COATED ORAL at 07:07

## 2019-07-29 RX ADMIN — PROPOFOL 35 MCG/KG/MIN: 10 INJECTION, EMULSION INTRAVENOUS at 03:07

## 2019-07-29 RX ADMIN — INSULIN ASPART 4 UNITS: 100 INJECTION, SOLUTION INTRAVENOUS; SUBCUTANEOUS at 05:07

## 2019-07-29 RX ADMIN — TRAMADOL HYDROCHLORIDE 50 MG: 50 TABLET, FILM COATED ORAL at 07:07

## 2019-07-29 RX ADMIN — INSULIN ASPART 8 UNITS: 100 INJECTION, SOLUTION INTRAVENOUS; SUBCUTANEOUS at 12:07

## 2019-07-29 RX ADMIN — PIPERACILLIN AND TAZOBACTAM 4.5 G: 4; .5 INJECTION, POWDER, LYOPHILIZED, FOR SOLUTION INTRAVENOUS; PARENTERAL at 07:07

## 2019-07-29 NOTE — SUBJECTIVE & OBJECTIVE
Interval History: Remains on pressors. No family at bedside during rounds. Sosa draining well, urine clearing.     Review of Systems   Unable to perform ROS: Intubated     Objective:     Temp:  [97.7 °F (36.5 °C)-98.7 °F (37.1 °C)] 98.3 °F (36.8 °C)  Pulse:  [] 82  Resp:  [12-31] 22  SpO2:  [92 %-100 %] 99 %  BP: ()/() 96/54  Arterial Line BP: ()/(43-92) 101/50     Body mass index is 34.12 kg/m².    Date 07/29/19 0700 - 07/30/19 0659   Shift 0504-0874 2224-9663 9651-4785 24 Hour Total   INTAKE   I.V.(mL/kg) 341.4(4.2)   341.4(4.2)   Shift Total(mL/kg) 341.4(4.2)   341.4(4.2)   OUTPUT   Urine(mL/kg/hr) 375   375   Shift Total(mL/kg) 375(4.6)   375(4.6)   Weight (kg) 81.9 81.9 81.9 81.9          Drains     Drain                 Urethral Catheter 07/28/19 1045 Straight-tip 16 Fr. less than 1 day                Physical Exam   Nursing note and vitals reviewed.  Constitutional: She appears well-developed. No distress. She is intubated.   HENT:   Head: Normocephalic.   Eyes: Conjunctivae are normal.   Neck: Normal range of motion. No tracheal deviation present. No thyromegaly present.   Cardiovascular: Normal rate, normal heart sounds and normal pulses.    Pulmonary/Chest: She is intubated. No respiratory distress.   Abdominal: Soft. She exhibits no distension and no mass. There is no hepatosplenomegaly. There is no tenderness. There is no rebound and no guarding. No hernia.   Genitourinary:   Genitourinary Comments: Sosa - light pink urine in bag, clearing in tubing   Musculoskeletal: Normal range of motion. She exhibits no edema or tenderness.   Lymphadenopathy:     She has no cervical adenopathy.   Neurological:   sedated   Skin: Skin is warm and dry. No rash noted. No erythema.         Significant Labs:    BMP:  Recent Labs   Lab 07/28/19  0310 07/28/19  1153 07/28/19  2328   * 137 139   K 3.9 4.3 3.7    107 104   CO2 21* 13* 20*   BUN 29* 30* 30*   CREATININE 2.4* 2.2* 1.9*    CALCIUM 8.2* 8.2* 8.0*       CBC:   Recent Labs   Lab 07/27/19  1931 07/28/19  0310 07/28/19  1153   WBC 1.07* 12.09 21.83*   HGB 11.3* 9.6* 10.1*   HCT 35.7* 30.5* 32.4*   * 139* 140*       Blood Culture:   Recent Labs   Lab 07/27/19 1931 07/27/19  1935   LABBLOO Gram stain nilsa bottle:Gram negative rods  Gram stain aer bottle: Gram negative rods  Results called to and read back by:ICU BRENNEN ROGER   07/28/2019  07:37  GRAM NEGATIVE GABRIELA  Identification and susceptibility pending  * Gram stain aer bottle: Gram negative rods  Gram stain nilsa bottle: Gram negative rods  Results called to and read back by: ICU BRENNENJOAN ROGER 07/28/2019  07:40  GRAM NEGATIVE GABRIELA*     Urine Culture: No results for input(s): LABURIN in the last 168 hours.  Urine Studies:   Recent Labs   Lab 07/27/19 1942 07/28/19  0110   COLORU Yellow Red*   APPEARANCEUA Clear Hazy*   PHUR 5.0 7.0   SPECGRAV 1.015 1.020   PROTEINUA Negative 2+*   GLUCUA 1+* 1+*   KETONESU Negative Negative   BILIRUBINUA Negative Negative   OCCULTUA Negative 3+*   NITRITE Negative Negative   UROBILINOGEN Negative Negative   LEUKOCYTESUR Negative 3+*   RBCUA  --  >100*   WBCUA  --  45*   BACTERIA  --  Few*   HYALINECASTS  --  0       Significant Imaging:  All pertinent imaging results/findings from the past 24 hours have been reviewed.

## 2019-07-29 NOTE — PROGRESS NOTES
[facility] Hospital Medicine  Progress Note    Patient Name: Dasia Abreu  MRN: [id number]  Patient Class: IP- Inpatient   Admission Date: 7/27/2019  Length of Stay: 2 days  Attending Physician: Ministerio Crenshaw MD  Primary Care Provider: Primary Doctor No        Subjective:     Principal Problem:Severe sepsis with septic shock      HPI:  Ms. Dasia Abreu is a 68 y.o. female from [place] with essential hypertension, type 2 diabetes mellitus (HbA1c unknown), CAD, and anemia chronic disease who presents to [facility] ED with complaints of diarrhea starting five days ago.  Her elderly mother lives with her in [place] and was driven into town five days ago to attend an appointment with her doctors.  Starting that they she started to watery stools that had resolved by the 2nd day.  Her friend recently bought a new car and she and her group of friends took the car to the [facility].  She started to feel very tired and lethargic and had chills once she arrived home.  She also complained of lower abdominal pain that started that day that was associated with urinary frequency; she denies any dysuria, hematuria, nor any urinary urgency.  Abdominal pain was nonradiating and was 8/10 in severity at its worst.  She also denies any chest pain, shortness of breath, palpitations, nor any diaphoresis.  She also reports right shoulder/right lower neck pain starting around the same time and started have a headache starting yesterday.  She denies any blurry vision.  She also denies any rashes nor joint pain. She has otherwise been in her usual state of health.    Overview/Hospital Course:  69 y/o female presented with septic shock secondary to obstructive pyelonephritis with ureteral stone and Proteus bacteremia.  Admitted to ICU on Levophed.  Urology consulted.  Patient underwent cysto with stent placement on 7/28.  Patient also noted to have elevated troponin, consistent with NSTEMI.  Cardiology consulted.  Also  presented with ARF.  Patient unable to get extubated post procedure and Pulmonary consulted.  Initially started on Vanc and Zosyn.  Vanc stopped.    Interval History: Remains on vent and Levophed.    Review of Systems   Unable to perform ROS: Intubated     Objective:     Vital Signs (Most Recent):  Temp: 98.2 °F (36.8 °C) (07/29/19 1200)  Pulse: 76 (07/29/19 1230)  Resp: 20 (07/29/19 1230)  BP: (!) 97/52 (07/29/19 1230)  SpO2: 100 % (07/29/19 1230) Vital Signs (24h Range):  Temp:  [98 °F (36.7 °C)-98.7 °F (37.1 °C)] 98.2 °F (36.8 °C)  Pulse:  [] 76  Resp:  [20-41] 20  SpO2:  [92 %-100 %] 100 %  BP: ()/(43-93) 97/52  Arterial Line BP: ()/(39-92) 106/47     Weight: 81.9 kg (180 lb 8.9 oz)  Body mass index is 34.12 kg/m².    Intake/Output Summary (Last 24 hours) at 7/29/2019 1253  Last data filed at 7/29/2019 1204  Gross per 24 hour   Intake 4312.95 ml   Output 3145 ml   Net 1167.95 ml      Physical Exam   Constitutional: She appears well-developed.   HENT:   Head: Normocephalic and atraumatic.   Nose: Nose normal.   Mouth/Throat: Oropharynx is clear and moist.   ET tube in place   Eyes: Pupils are equal, round, and reactive to light. EOM are normal.   Neck: Normal range of motion.   Cardiovascular: Normal rate, regular rhythm and normal heart sounds.   Pulmonary/Chest: Breath sounds normal. No respiratory distress. She has no wheezes. She has no rales. She exhibits no tenderness.   Mechanically ventilated   Abdominal: Soft. Bowel sounds are normal. She exhibits no mass. There is no rebound and no guarding.   Genitourinary:   Genitourinary Comments: Sosa in place.  +bloody urine.     Musculoskeletal: She exhibits no edema or tenderness.   Neurological: She is alert. No cranial nerve deficit. Coordination normal.   Follows command.     Skin: No rash noted. No erythema. No pallor.   Psychiatric: She has a normal mood and affect. Her behavior is normal.       Significant Labs:   BMP:   Recent Labs   Lab  07/29/19  1013   *      K 3.1*      CO2 26   BUN 26*   CREATININE 1.6*   CALCIUM 7.8*   MG 1.7     CBC:   Recent Labs   Lab 07/28/19  1153 07/29/19  0528 07/29/19  1013   WBC 21.83* 9.73 9.41   HGB 10.1* 9.0* 8.8*   HCT 32.4* 27.2* 26.6*   * 71* 66*       Significant Imaging: I have reviewed all pertinent imaging results/findings within the past 24 hours.      Assessment/Plan:      * Severe sepsis with septic shock  Secondary to obstructive pyelonephritis with ureteral stone and Proteus bacteremia.  Proteus Sepsis.  S/P cysto with stent placement.  Initially started on Vanc and Zosyn.  Vanc stopped.  Still requiring some Levophed.  Wean as tolerated.  Repeat BCx to document clearance.    Acute respiratory failure with hypoxia  Patient was unable to get extubated post procedure.  Unsure etiology.  Appreciate Pulmonary input.  Looks well and may be able to get extubated today.      Hypokalemia  Replace as needed.      Thrombocytopenia, unspecified  Secondary to sepsis.  Monitor       Elevated troponin        Obstructive pyelonephritis  Appreciate  input.  S/P cysto with stent placement on 7/28.  Sosa with bloody urine.  Follow  recs.  Will hold Lovenox for now as she is also thrombocytopenic.  SCD's and KANDICE's for DVT prophylaxis.      Anemia of chronic disease  Unfortunately we do not have previous lab work to which to compare to chronicity of her anemia, though she does not have any source of acute bleeding.  There is no indication for transfusion at this time.  Will continue to monitor.    Coronary artery disease  She was noted on cardiac CT-A to have nonobstructive coronary artery disease in September 2018 and has been following up with a cardiologist in [place].    Patient with elevated Troponin, consistent with NSTEMI.  This may be secondary to demand from septic shock.  Appreciate Cards input.  Possibly ischemic work up once stable.    Type 2 diabetes mellitus  Review of her  previous medical records reveal that she is on metformin and mixed insulin therapy; will provide basal-prandial insulin therapy along with insulin sliding scale.   HgA1c of 8.4    Essential hypertension  Currently hypotensive.  Hold home BP medications.    Acute renal failure  We do not have previous lab work to which compared to chronicity of her renal failure but it is presumed to be acute in chronicity.    Probably secondary to sepsis and obstruction.  Improving with IVF's.      VTE Risk Mitigation (From admission, onward)        Ordered     enoxaparin injection 30 mg  Daily      07/28/19 1623     Place sequential compression device  Until discontinued      07/29/19 1254     Place KANDICE hose  Until discontinued      07/29/19 1254     IP VTE LOW RISK PATIENT  Once      07/28/19 0018          Critical care time spent on the evaluation and treatment of severe organ dysfunction, review of pertinent labs and imaging studies, discussions with consulting providers and discussions with patient/family: 60 minutes.      Ministerio Crenshaw MD  Department of Hospital Medicine   [facility]

## 2019-07-29 NOTE — PLAN OF CARE
Problem: Adult Inpatient Plan of Care  Goal: Plan of Care Review  Outcome: Ongoing (interventions implemented as appropriate)  Patient extubated to nasal cannula.  Weaned off propofol drip and weaned precedex down.  Attempts made to wean levophed with little success---BP labile.  Accu checks requiring sliding scale insulin coverage.  C/o headache--meds given  Family visits most of day.

## 2019-07-29 NOTE — PROGRESS NOTES
[facility]  Urology  Progress Note    Patient Name: Dasia Abreu  MRN: [id number]  Admission Date: 7/27/2019  Hospital Length of Stay: 2 days  Code Status: Full Code   Attending Provider: Ministerio Crenshaw MD   Primary Care Physician: Primary Doctor No    Subjective:     HPI:  Urolithiasis  Patient complains of right abdominal pain with radiation to the abdomen. Onset of symptoms was abrupt starting 2 days ago with unchanged course since that time. Patient describes the pain as aching, intermittent and rated as moderate. The patient has had nausea and vomiting and diaphoresis. There has been fever and chills. The patient is not complaining of dysuria or frequency. Risk factors for urolithiasis: none.        Interval History: Remains on pressors. No family at bedside during rounds. Sosa draining well, urine clearing.     Review of Systems   Unable to perform ROS: Intubated     Objective:     Temp:  [97.7 °F (36.5 °C)-98.7 °F (37.1 °C)] 98.3 °F (36.8 °C)  Pulse:  [] 82  Resp:  [12-31] 22  SpO2:  [92 %-100 %] 99 %  BP: ()/() 96/54  Arterial Line BP: ()/(43-92) 101/50     Body mass index is 34.12 kg/m².    Date 07/29/19 0700 - 07/30/19 0659   Shift 8478-6661 3661-4172 0306-7198 24 Hour Total   INTAKE   I.V.(mL/kg) 341.4(4.2)   341.4(4.2)   Shift Total(mL/kg) 341.4(4.2)   341.4(4.2)   OUTPUT   Urine(mL/kg/hr) 375   375   Shift Total(mL/kg) 375(4.6)   375(4.6)   Weight (kg) 81.9 81.9 81.9 81.9          Drains     Drain                 Urethral Catheter 07/28/19 1045 Straight-tip 16 Fr. less than 1 day                Physical Exam   Nursing note and vitals reviewed.  Constitutional: She appears well-developed. No distress. She is intubated.   HENT:   Head: Normocephalic.   Eyes: Conjunctivae are normal.   Neck: Normal range of motion. No tracheal deviation present. No thyromegaly present.   Cardiovascular: Normal rate, normal heart sounds and normal pulses.    Pulmonary/Chest: She  is intubated. No respiratory distress.   Abdominal: Soft. She exhibits no distension and no mass. There is no hepatosplenomegaly. There is no tenderness. There is no rebound and no guarding. No hernia.   Genitourinary:   Genitourinary Comments: Sosa - light pink urine in bag, clearing in tubing   Musculoskeletal: Normal range of motion. She exhibits no edema or tenderness.   Lymphadenopathy:     She has no cervical adenopathy.   Neurological:   sedated   Skin: Skin is warm and dry. No rash noted. No erythema.         Significant Labs:    BMP:  Recent Labs   Lab 07/28/19  0310 07/28/19  1153 07/28/19  2328   * 137 139   K 3.9 4.3 3.7    107 104   CO2 21* 13* 20*   BUN 29* 30* 30*   CREATININE 2.4* 2.2* 1.9*   CALCIUM 8.2* 8.2* 8.0*       CBC:   Recent Labs   Lab 07/27/19 1931 07/28/19 0310 07/28/19  1153   WBC 1.07* 12.09 21.83*   HGB 11.3* 9.6* 10.1*   HCT 35.7* 30.5* 32.4*   * 139* 140*       Blood Culture:   Recent Labs   Lab 07/27/19 1931 07/27/19 1935   LABBLOO Gram stain nilsa bottle:Gram negative rods  Gram stain aer bottle: Gram negative rods  Results called to and read back by:[facility]   07/28/2019  07:37  GRAM NEGATIVE GABRIELA  Identification and susceptibility pending  * Gram stain aer bottle: Gram negative rods  Gram stain nilsa bottle: Gram negative rods  Results called to and read back by: ICU [facility] 07/28/2019  07:40  GRAM NEGATIVE GABRIELA*     Urine Culture: No results for input(s): LABURIN in the last 168 hours.  Urine Studies:   Recent Labs   Lab 07/27/19  1942 07/28/19  0110   COLORU Yellow Red*   APPEARANCEUA Clear Hazy*   PHUR 5.0 7.0   SPECGRAV 1.015 1.020   PROTEINUA Negative 2+*   GLUCUA 1+* 1+*   KETONESU Negative Negative   BILIRUBINUA Negative Negative   OCCULTUA Negative 3+*   NITRITE Negative Negative   UROBILINOGEN Negative Negative   LEUKOCYTESUR Negative 3+*   RBCUA  --  >100*   WBCUA  --  45*   BACTERIA  --  Few*   HYALINECASTS  --  0       Significant  Imaging:  All pertinent imaging results/findings from the past 24 hours have been reviewed.                  Assessment/Plan:     * Severe sepsis with septic shock  2/2 UTI/obstructing ureteral stone    Ureteral calculus  With UTI/sepsis  S/p stent placement  Will need treatment of stone in future when acute illness resolved    Obstructive pyelonephritis  2/2 R ureteral stone  S/p R ureteral stent placement by Dr Mendez 7/28/19    Acute renal failure  Stent placed  Continue IVF        VTE Risk Mitigation (From admission, onward)        Ordered     enoxaparin injection 30 mg  Daily      07/28/19 1623     IP VTE LOW RISK PATIENT  Once      07/28/19 0018          Brittney Plata MD  Urology  [facility]

## 2019-07-29 NOTE — ASSESSMENT & PLAN NOTE
Appreciate  input.  S/P cysto with stent placement on 7/28.  Sosa with bloody urine.  Follow  recs.  Will hold Lovenox for now as she is also thrombocytopenic.  SCD's and KANDICE's for DVT prophylaxis.

## 2019-07-29 NOTE — ASSESSMENT & PLAN NOTE
Secondary to bacteremia as blood culture with proteus from urosepsis.    --S/p ureteral stent placement for source control  --levophed requirements decreasing  --deescalate pip tazo for pan sensitive bacteria.   --repeat cultures ordered

## 2019-07-29 NOTE — SUBJECTIVE & OBJECTIVE
Past Medical History:   Diagnosis Date    Diabetes mellitus     Diverticulitis     Hypertension     Thyroid disease        Past Surgical History:   Procedure Laterality Date    BACK SURGERY      BLADDER SUSPENSION      CATARACT EXTRACTION, BILATERAL      CYSTOSCOPY, WITH URETERAL STENT INSERTION Bilateral 7/28/2019    Performed by [facility]endez [facility] at [facility] OR    HYSTERECTOMY         Review of patient's allergies indicates:  No Known Allergies    No current facility-administered medications on file prior to encounter.      No current outpatient medications on file prior to encounter.     Family History     None        Tobacco Use    Smoking status: Never Smoker   Substance and Sexual Activity    Alcohol use: Never     Frequency: Never    Drug use: Never    Sexual activity: Not on file     Review of Systems   Constitution: Positive for malaise/fatigue.   HENT: Negative.    Eyes: Negative.    Cardiovascular: Positive for chest pain.   Respiratory: Negative.    Endocrine: Negative.    Hematologic/Lymphatic: Negative.    Skin: Negative.    Musculoskeletal: Negative.    Gastrointestinal: Negative.    Genitourinary: Negative.    Neurological: Negative.    Psychiatric/Behavioral: Negative.    Allergic/Immunologic: Negative.      Objective:     Vital Signs (Most Recent):  Temp: 98.2 °F (36.8 °C) (07/29/19 1200)  Pulse: 77 (07/29/19 1730)  Resp: (!) 27 (07/29/19 1730)  BP: (!) 111/55 (07/29/19 1730)  SpO2: 99 % (07/29/19 1730) Vital Signs (24h Range):  Temp:  [98 °F (36.7 °C)-98.7 °F (37.1 °C)] 98.2 °F (36.8 °C)  Pulse:  [75-89] 77  Resp:  [18-41] 27  SpO2:  [92 %-100 %] 99 %  BP: ()/(43-68) 111/55  Arterial Line BP: ()/(39-75) 126/52     Weight: 81.9 kg (180 lb 8.9 oz)  Body mass index is 34.12 kg/m².    SpO2: 99 %  O2 Device (Oxygen Therapy): nasal cannula      Intake/Output Summary (Last 24 hours) at 7/29/2019 1759  Last data filed at 7/29/2019 1700  Gross per 24 hour   Intake 3395.1 ml   Output  2875 ml   Net 520.1 ml       Lines/Drains/Airways     Central Venous Catheter Line                 Percutaneous Central Line Insertion/Assessment - triple lumen  07/27/19 2220 right internal jugular 1 day          Drain                 Urethral Catheter 07/28/19 1045 Straight-tip 16 Fr. 1 day          Arterial Line                 Arterial Line 07/28/19 1006 Right Radial 1 day          Peripheral Intravenous Line                 Peripheral IV - Single Lumen 07/27/19 20 G Right Upper Arm 2 days                Physical Exam   Constitutional: She is oriented to person, place, and time. She appears well-developed and well-nourished.   HENT:   Head: Normocephalic.   Eyes: Pupils are equal, round, and reactive to light.   Neck: Normal range of motion. Neck supple.   Cardiovascular: Normal rate and regular rhythm.   Pulmonary/Chest: Effort normal and breath sounds normal.   Abdominal: Soft. Normal appearance and bowel sounds are normal. There is no tenderness.   Musculoskeletal: Normal range of motion.   Neurological: She is alert and oriented to person, place, and time.   Skin: Skin is warm.   Psychiatric: She has a normal mood and affect.   Nursing note and vitals reviewed.      Significant Labs:   BMP:   Recent Labs   Lab 07/28/19  0310 07/28/19  1153 07/28/19  2328 07/29/19  0528 07/29/19  1013   * 261* 353*  --  332*   * 137 139  --  139   K 3.9 4.3 3.7  --  3.1*    107 104  --  100   CO2 21* 13* 20*  --  26   BUN 29* 30* 30*  --  26*   CREATININE 2.4* 2.2* 1.9*  --  1.6*   CALCIUM 8.2* 8.2* 8.0*  --  7.8*   MG 0.9*  --   --  1.7 1.7   , CMP   Recent Labs   Lab 07/28/19  0310 07/28/19  1153 07/28/19  2328 07/29/19  1013   * 137 139 139   K 3.9 4.3 3.7 3.1*    107 104 100   CO2 21* 13* 20* 26   * 261* 353* 332*   BUN 29* 30* 30* 26*   CREATININE 2.4* 2.2* 1.9* 1.6*   CALCIUM 8.2* 8.2* 8.0* 7.8*   PROT 5.2* 6.2  --  5.6*   ALBUMIN 2.7* 2.9*  --  2.2*   BILITOT 0.8 0.7  --  1.0    ALKPHOS 43* 50*  --  59   AST 24 32  --  27   ALT 18 24  --  27   ANIONGAP 9 17* 15 13   ESTGFRAFRICA 23* 26* 31* 38*   EGFRNONAA 20* 22* 27* 33*   , CBC   Recent Labs   Lab 07/28/19  1153 07/29/19  0528 07/29/19  1013   WBC 21.83* 9.73 9.41   HGB 10.1* 9.0* 8.8*   HCT 32.4* 27.2* 26.6*   * 71* 66*   , INR No results for input(s): INR, PROTIME in the last 48 hours., Lipid Panel No results for input(s): CHOL, HDL, LDLCALC, TRIG, CHOLHDL in the last 48 hours., Troponin   Recent Labs   Lab 07/28/19  1638 07/28/19  2328 07/29/19  0528   TROPONINI 1.093* 1.170* 0.895*    and All pertinent lab results from the last 24 hours have been reviewed.    Significant Imaging: EKG was personally reviewed.

## 2019-07-29 NOTE — ASSESSMENT & PLAN NOTE
She was noted on cardiac CT-A to have nonobstructive coronary artery disease in September 2018 and has been following up with a cardiologist in [place].    Patient with elevated Troponin, consistent with NSTEMI.  This may be secondary to demand from septic shock.  Appreciate Cards input.  Possibly ischemic work up once stable.

## 2019-07-29 NOTE — PLAN OF CARE
Problem: Communication Impairment (Mechanical Ventilation, Invasive)  Goal: Effective Communication    Intervention: Ensure Effective Communication  Calming measures

## 2019-07-29 NOTE — ASSESSMENT & PLAN NOTE
Patient was unable to get extubated post procedure.  Unsure etiology.  Appreciate Pulmonary input.  Looks well and may be able to get extubated today.

## 2019-07-29 NOTE — HOSPITAL COURSE
07/29: Propofol d/c'd and patient passed SAT/SBT allowing for extubation to nasal canula. Repeat blood cultures ordered as pan sensitive proteus growing. Recommend de-escalation of pip tazo.     07/30: tolerating extuvation without incident. Urine culture also growing gram negative rods. Patient still on pip tazo, deescalation tristen pip taxo to cefepime recommended.     Thank you for the consult. We will sign off at this time, please call with any questions or concerns

## 2019-07-29 NOTE — HOSPITAL COURSE
69 y/o female presented with septic shock secondary to obstructive pyelonephritis with ureteral stone and Proteus bacteremia.  Admitted to ICU on Levophed.  Urology consulted.  Patient underwent cysto with stent placement on 7/28.  Patient also noted to have elevated troponin, consistent with NSTEMI.  Cardiology consulted.  Also presented with ARF.  Patient unable to get extubated post procedure and Pulmonary consulted.  Initially started on Vanc and Zosyn.  Vanc stopped.  She was started on DAPT on 7/30/2019.  [facility] 7/31/2019. Culprit vessel was proximal to mid OM1 with severe 90% stenosis.  Successful PCI of OM1 with drug-eluting stent x1.  PT/OT consulted on 7/31 for an eval. Renal function improved.  PT/OT rec: home with H/H.  Urology pulled khoury and signed off. Patient will need follow up with cards and urology in [place] in the next 1-2 weeks. Patient will go  Home on Cipro for 12 days. Activity as tolerated. Diet- low NA ADA 1800 karol diet.  Follow up with PCP in one week. H/H will be arranged.     Patient told me that she is on a B-blocker and statin as well as DM meds at home. She will resume all.

## 2019-07-29 NOTE — ASSESSMENT & PLAN NOTE
Significant troponin elevation in a patient with multiple risk factors for coronary artery disease, known nonobstructive coronary artery disease in the setting of acute renal failure and septic shock, in the absence of reported chest pain.    Continue to monitor closely.  Patient states that she had a CT angiogram done yesterday which showed nonobstructive coronary artery disease.  Start dual antiplatelet therapy when able.  Lovenox has been held because of thrombocytopenia.  No no large wall motion abnormalities on echo  Currently patient in septic shock and acute renal failure and worsening thrombocytopenia, once these issues resolve, will consider coronary angiogram.

## 2019-07-29 NOTE — ASSESSMENT & PLAN NOTE
Secondary to nephrolithiasis causing obstruction and hydronephrosis  --s/p r uretal stent placement  --Good urine output noted  --keep MAP >65 for perfusion  --trend sCr

## 2019-07-29 NOTE — PLAN OF CARE
Problem: Adult Inpatient Plan of Care  Goal: Plan of Care Review  Outcome: Ongoing (interventions implemented as appropriate)  Pt is on vent settings PRVC/22/450/+5/30% 7.0 ETT 22@lip Ambu bag is at HOB and all alarms are set and working properly. She is resting comfortably. Will continue to monitor.

## 2019-07-29 NOTE — ASSESSMENT & PLAN NOTE
With UTI/sepsis  S/p stent placement  Will need treatment of stone in future when acute illness resolved

## 2019-07-29 NOTE — CARE UPDATE
[facility]  ICU Multidisciplinary Bedside Rounds   SUMMARY     Date: 7/29/2019    Prehospitalization: Home  Admit Date / LOS : 7/27/2019/ 2 days    Diagnosis: Severe sepsis with septic shock    Consults:        Active: Cardio, Pulm CC, SW and Urology       Needed: N/A     Code Status: Full Code   Advanced Directive: <no information>    LDA: Art Line, Sosa, PIV, TLC and Vent       Central Lines/Site/Justification:Pressors       Urinary Cath/Order/Justification:Place by Urology Service    Vasopressors/Infusions:    dexmedetomidine (PRECEDEX) infusion 1.2 mcg/kg/hr (07/29/19 0517)    norepinephrine bitartrate-D5W 0.06 mcg/kg/min (07/29/19 0518)    propofol 35 mcg/kg/min (07/29/19 0334)    sodium bicarbonate drip 125 mL/hr at 07/29/19 0025          GOALS: Volume/ Hemodynamic: MAP >65                     RASS: -2  light sedation, briefly awakes to voice (eye opening)    CAM ICU: Positive  Pain Management: IV       Pain Controlled: yes     Rhythm: NSR (occasional junctional beats noted)    Respiratory Device: Vent    Vent Mode: PRVC  Oxygen Concentration (%):  [] 30  Resp Rate Total:  [14 br/min-37 br/min] 22 br/min  Vt Set:  [430 mL-450 mL] 450 mL  PEEP/CPAP:  [5 cmH20] 5 cmH20  Mean Airway Pressure:  [10.6 mlG56-68.1 cmH20] 10.6 cmH20             Most Recent SBT/ SAT: N/A       MOVE Screen: FAIL    VTE Prophylaxis: Mechanical  Mobility: Bedrest  Stress Ulcer Prophylaxis: Yes    Dietary: NPO  Tolerance: not applicable  /  Advancement: no    Isolation: No active isolations    Restraints: Yes    Significant Dates:  Post Op Date: N/A  Rescue Date: N/A  Imaging/ Diagnostics: 7/28 CT of abdomen/pelvis; 7/27 CT of head    Noteworthy Labs:  Review the following labs before -     CBC/Anemia Labs: Coags:    Recent Labs   Lab 07/27/19  1931 07/28/19  0310 07/28/19  1153   WBC 1.07* 12.09 21.83*   HGB 11.3* 9.6* 10.1*   HCT 35.7* 30.5* 32.4*   * 139* 140*   MCV 94 94 96   RDW 14.1 14.3 14.5     No results for input(s): PT, INR, APTT in the last 168 hours.     Chemistries:   Recent Labs   Lab 07/27/19  1931 07/28/19  0310 07/28/19  1153 07/28/19  2328    135* 137 139   K 3.7 3.9 4.3 3.7    105 107 104   CO2 20* 21* 13* 20*   BUN 27* 29* 30* 30*   CREATININE 1.8* 2.4* 2.2* 1.9*   CALCIUM 9.6 8.2* 8.2* 8.0*   PROT 6.3 5.2* 6.2  --    BILITOT 1.1* 0.8 0.7  --    ALKPHOS 73 43* 50*  --    ALT 25 18 24  --    AST 20 24 32  --    MG  --  0.9*  --   --    PHOS  --  2.4*  --   --         Cardiac Enzymes: Ejection Fractions:    Recent Labs     07/28/19  1638 07/28/19  2328 07/29/19  0528   TROPONINI 1.093* 1.170* 0.895*    No results found for: EF     POCT Glucose: HbA1c:    Recent Labs   Lab 07/28/19  0409 07/28/19  0837 07/28/19  1148 07/28/19  1917 07/29/19  0019 07/29/19  0530   POCTGLUCOSE 215* 272* 269* 301* 332* 313*    Hemoglobin A1C   Date Value Ref Range Status   07/28/2019 8.4 (H) 4.0 - 5.6 % Final     Comment:     ADA Screening Guidelines:  5.7-6.4%  Consistent with prediabetes  >or=6.5%  Consistent with diabetes  High levels of fetal hemoglobin interfere with the HbA1C  assay. Heterozygous hemoglobin variants (HbS, HgC, etc)do  not significantly interfere with this assay.   However, presence of multiple variants may affect accuracy.          Needs from Care Team: Due to increasingly high blood glucose levels may need to start patient on basal insulin dosage along with sliding scale.      ICU LOS 1d 6h  Level of Care: Critical Care

## 2019-07-29 NOTE — PROGRESS NOTES
[facility]  Pulmonology  Progress Note    Patient Name: Dasia Abreu  MRN: [id number]  Admission Date: 7/27/2019  Hospital Length of Stay: 2 days  Code Status: Full Code  Attending Provider: Ministerio Crenshaw MD  Primary Care Provider: Primary Doctor No   Principal Problem: Severe sepsis with septic shock    Subjective:     ROS: unable to assess (intubated)    Objective:     Vital Signs (Most Recent):  Temp: 98.3 °F (36.8 °C) (07/29/19 0715)  Pulse: 78 (07/29/19 1100)  Resp: 20 (07/29/19 1100)  BP: (!) 99/54 (07/29/19 1100)  SpO2: 100 % (07/29/19 1100) Vital Signs (24h Range):  Temp:  [97.7 °F (36.5 °C)-98.7 °F (37.1 °C)] 98.3 °F (36.8 °C)  Pulse:  [] 78  Resp:  [12-31] 20  SpO2:  [92 %-100 %] 100 %  BP: ()/() 99/54  Arterial Line BP: ()/(39-92) 103/46     Weight: 81.9 kg (180 lb 8.9 oz)  Body mass index is 34.12 kg/m².      Intake/Output Summary (Last 24 hours) at 7/29/2019 1119  Last data filed at 7/29/2019 1100  Gross per 24 hour   Intake 4948.22 ml   Output 3205 ml   Net 1743.22 ml       Physical Exam   Constitutional: She appears well-developed. She appears lethargic. She is sleeping and cooperative. She is easily aroused. She has a sickly appearance. No distress. She is sedated and intubated.   HENT:   Head: Normocephalic and atraumatic.   Eyes: Pupils are equal, round, and reactive to light. Conjunctivae are normal. Right eye exhibits no exudate. Left eye exhibits no exudate. Right conjunctiva has no hemorrhage. Left conjunctiva has no hemorrhage. No scleral icterus. Right eye exhibits no nystagmus. Left eye exhibits no nystagmus.   Neck: Trachea normal. No neck rigidity. No tracheal deviation present.   Cardiovascular: Normal rate, regular rhythm and normal heart sounds. PMI is not displaced. Exam reveals no gallop and no friction rub.   No murmur heard.  Pulses:       Radial pulses are 2+ on the right side, and 2+ on the left side.        Dorsalis pedis pulses are 2+  on the right side, and 2+ on the left side.   Pulmonary/Chest: Effort normal and breath sounds normal. No accessory muscle usage. She is intubated. No respiratory distress. She has no wheezes. She has no rhonchi. She has no rales.   Abdominal: Soft. Normal appearance and bowel sounds are normal. There is no tenderness.   Musculoskeletal: Normal range of motion.   Neurological: She is easily aroused. She appears lethargic. No cranial nerve deficit or sensory deficit. GCS eye subscore is 3. GCS verbal subscore is 1. GCS motor subscore is 6.   Non focal on exam, viveros spontaneously 4/5. Follows commands despite drowsiness from sedation   Skin: Skin is warm and dry. Capillary refill takes 2 to 3 seconds. She is not diaphoretic. No cyanosis. Nails show no clubbing.   Nursing note and vitals reviewed.      Vents:  Vent Mode: PRVC (07/29/19 0739)  Ventilator Initiated: Yes (07/28/19 1137)  Set Rate: 22 bmp (07/29/19 0739)  Vt Set: 450 mL (07/29/19 0739)  PEEP/CPAP: 5 cmH20 (07/29/19 0739)  Oxygen Concentration (%): 30 (07/29/19 1015)  Peak Airway Pressure: 26 cmH2O (07/29/19 0739)  Total Ve: 10.1 mL (07/29/19 0739)  F/VT Ratio<105 (RSBI): (!) 47.72 (07/29/19 0739)    Lines/Drains/Airways     Central Venous Catheter Line                 Percutaneous Central Line Insertion/Assessment - triple lumen  07/27/19 2220 right internal jugular 1 day          Drain                 Urethral Catheter 07/28/19 1045 Straight-tip 16 Fr. 1 day          Airway                 Airway - Non-Surgical 07/28/19 1035 Endotracheal Tube 1 day          Arterial Line                 Arterial Line 07/28/19 1006 Right Radial 1 day          Peripheral Intravenous Line                 Peripheral IV - Single Lumen 07/27/19 20 G Right Upper Arm 2 days                Significant Labs:    CBC/Anemia Profile:  Recent Labs   Lab 07/28/19  1153 07/29/19  0528 07/29/19  1013   WBC 21.83* 9.73 9.41   HGB 10.1* 9.0* 8.8*   HCT 32.4* 27.2* 26.6*   * 71* 66*    MCV 96 91 91   RDW 14.5 14.4 14.4        Chemistries:  Recent Labs   Lab 07/28/19  0310 07/28/19  1153 07/28/19  2328 07/29/19  0528 07/29/19  1013   * 137 139  --  139   K 3.9 4.3 3.7  --  3.1*    107 104  --  100   CO2 21* 13* 20*  --  26   BUN 29* 30* 30*  --  26*   CREATININE 2.4* 2.2* 1.9*  --  1.6*   CALCIUM 8.2* 8.2* 8.0*  --  7.8*   ALBUMIN 2.7* 2.9*  --   --  2.2*   PROT 5.2* 6.2  --   --  5.6*   BILITOT 0.8 0.7  --   --  1.0   ALKPHOS 43* 50*  --   --  59   ALT 18 24  --   --  27   AST 24 32  --   --  27   MG 0.9*  --   --  1.7 1.7   PHOS 2.4*  --   --  1.4*  --        All pertinent labs within the past 24 hours have been reviewed.    Significant Imaging:  I have reviewed all pertinent imaging results/findings within the past 24 hours.    Assessment/Plan:     * Severe sepsis with septic shock  Secondary to bacteremia as blood culture with proteus from urosepsis.    --S/p ureteral stent placement for source control  --levophed requirements decreasing  --deescalate pip tazo for pan sensitive bacteria.   --repeat cultures ordered    Acute respiratory failure with hypoxia  Secondary to severe acidosis  --abg with improved acidosis following source control and mechanical ventilation.   --SAT/SBT with anticipation of extubation     Elevated troponin  Most likely demand ischemia.  Card is on boad.      Acute renal failure  Secondary to nephrolithiasis causing obstruction and hydronephrosis  --s/p r uretal stent placement  --Good urine output noted  --keep MAP >65 for perfusion  --trend sCr      Metabolic acidosis-resolved as of 7/29/2019  Anion gap 17.  Most likely from lactic acidosis and arf.  hco3 drip.  Repeat abg improving.        Above discussed with Dr. Iyer  Critical Care time: 45 minutes      Sam Boudreaux NP  Pulmonology  [facility]

## 2019-07-29 NOTE — SUBJECTIVE & OBJECTIVE
Objective:     Vital Signs (Most Recent):  Temp: 98.3 °F (36.8 °C) (07/29/19 0715)  Pulse: 78 (07/29/19 1100)  Resp: 20 (07/29/19 1100)  BP: (!) 99/54 (07/29/19 1100)  SpO2: 100 % (07/29/19 1100) Vital Signs (24h Range):  Temp:  [97.7 °F (36.5 °C)-98.7 °F (37.1 °C)] 98.3 °F (36.8 °C)  Pulse:  [] 78  Resp:  [12-31] 20  SpO2:  [92 %-100 %] 100 %  BP: ()/() 99/54  Arterial Line BP: ()/(39-92) 103/46     Weight: 81.9 kg (180 lb 8.9 oz)  Body mass index is 34.12 kg/m².      Intake/Output Summary (Last 24 hours) at 7/29/2019 1119  Last data filed at 7/29/2019 1100  Gross per 24 hour   Intake 4948.22 ml   Output 3205 ml   Net 1743.22 ml       Physical Exam   Constitutional: She appears well-developed. She appears lethargic. She is sleeping and cooperative. She is easily aroused. She has a sickly appearance. No distress. She is sedated and intubated.   HENT:   Head: Normocephalic and atraumatic.   Eyes: Pupils are equal, round, and reactive to light. Conjunctivae are normal. Right eye exhibits no exudate. Left eye exhibits no exudate. Right conjunctiva has no hemorrhage. Left conjunctiva has no hemorrhage. No scleral icterus. Right eye exhibits no nystagmus. Left eye exhibits no nystagmus.   Neck: Trachea normal. No neck rigidity. No tracheal deviation present.   Cardiovascular: Normal rate, regular rhythm and normal heart sounds. PMI is not displaced. Exam reveals no gallop and no friction rub.   No murmur heard.  Pulses:       Radial pulses are 2+ on the right side, and 2+ on the left side.        Dorsalis pedis pulses are 2+ on the right side, and 2+ on the left side.   Pulmonary/Chest: Effort normal and breath sounds normal. No accessory muscle usage. She is intubated. No respiratory distress. She has no wheezes. She has no rhonchi. She has no rales.   Abdominal: Soft. Normal appearance and bowel sounds are normal. There is no tenderness.   Musculoskeletal: Normal range of motion.    Neurological: She is easily aroused. She appears lethargic. No cranial nerve deficit or sensory deficit. GCS eye subscore is 3. GCS verbal subscore is 1. GCS motor subscore is 6.   Non focal on exam, viveros spontaneously 4/5. Follows commands despite drowsiness from sedation   Skin: Skin is warm and dry. Capillary refill takes 2 to 3 seconds. She is not diaphoretic. No cyanosis. Nails show no clubbing.   Nursing note and vitals reviewed.      Vents:  Vent Mode: PRVC (07/29/19 0739)  Ventilator Initiated: Yes (07/28/19 1137)  Set Rate: 22 bmp (07/29/19 0739)  Vt Set: 450 mL (07/29/19 0739)  PEEP/CPAP: 5 cmH20 (07/29/19 0739)  Oxygen Concentration (%): 30 (07/29/19 1015)  Peak Airway Pressure: 26 cmH2O (07/29/19 0739)  Total Ve: 10.1 mL (07/29/19 0739)  F/VT Ratio<105 (RSBI): (!) 47.72 (07/29/19 0739)    Lines/Drains/Airways     Central Venous Catheter Line                 Percutaneous Central Line Insertion/Assessment - triple lumen  07/27/19 2220 right internal jugular 1 day          Drain                 Urethral Catheter 07/28/19 1045 Straight-tip 16 Fr. 1 day          Airway                 Airway - Non-Surgical 07/28/19 1035 Endotracheal Tube 1 day          Arterial Line                 Arterial Line 07/28/19 1006 Right Radial 1 day          Peripheral Intravenous Line                 Peripheral IV - Single Lumen 07/27/19 20 G Right Upper Arm 2 days                Significant Labs:    CBC/Anemia Profile:  Recent Labs   Lab 07/28/19  1153 07/29/19  0528 07/29/19  1013   WBC 21.83* 9.73 9.41   HGB 10.1* 9.0* 8.8*   HCT 32.4* 27.2* 26.6*   * 71* 66*   MCV 96 91 91   RDW 14.5 14.4 14.4        Chemistries:  Recent Labs   Lab 07/28/19  0310 07/28/19  1153 07/28/19  2328 07/29/19  0528 07/29/19  1013   * 137 139  --  139   K 3.9 4.3 3.7  --  3.1*    107 104  --  100   CO2 21* 13* 20*  --  26   BUN 29* 30* 30*  --  26*   CREATININE 2.4* 2.2* 1.9*  --  1.6*   CALCIUM 8.2* 8.2* 8.0*  --  7.8*   ALBUMIN  2.7* 2.9*  --   --  2.2*   PROT 5.2* 6.2  --   --  5.6*   BILITOT 0.8 0.7  --   --  1.0   ALKPHOS 43* 50*  --   --  59   ALT 18 24  --   --  27   AST 24 32  --   --  27   MG 0.9*  --   --  1.7 1.7   PHOS 2.4*  --   --  1.4*  --        All pertinent labs within the past 24 hours have been reviewed.    Significant Imaging:  I have reviewed all pertinent imaging results/findings within the past 24 hours.

## 2019-07-29 NOTE — PLAN OF CARE
"   07/29/19 1132   Discharge Assessment   Assessment Type Discharge Planning Assessment   Confirmed/corrected address and phone number on facesheet? Yes   Assessment information obtained from? Caregiver   Prior to hospitilization cognitive status: Alert/Oriented   Prior to hospitalization functional status: Independent   Current cognitive status: Unable to Assess   Facility Arrived From: home   Lives With spouse   Able to Return to Prior Arrangements yes   Is patient able to care for self after discharge? Yes   Readmission Within the Last 30 Days no previous admission in last 30 days   Patient currently being followed by outpatient case management? No   Patient currently receives any other outside agency services? No   Equipment Currently Used at Home glucometer   Do you have any problems affording any of your prescribed medications? No   Is the patient taking medications as prescribed? yes   Does the patient have transportation home? Yes   Transportation Anticipated family or friend will provide;car, drives self   Dialysis Name and Scheduled days N/A   Does the patient receive services at the [facility]? No   Discharge Plan A Home with family   Discharge Plan B Home with family   DME Needed Upon Discharge  none   Patient/Family in Agreement with Plan yes       SW met with pt and pt's family at bedside. SW explained her role in Care Management. Pt voiced understanding. SW inquired about HELP AT HOME. Pt stated that she will have Yvon at home to help for support. SW voiced understanding. SW inquired about responsibilities when it comes to  MANAGING HER/HIS HEALTH at home and what it entails. Pt inquired about details. SW informed pt of RESPONSIBILITIES of:    1. Follow up appointments  2. Getting Prescriptions filled  3. Taking medications as prescribed.     Pt voiced understanding.  SW explained "My Health Packet" blue folder and the pink and green tabs that are on the folder as well. Discharge Brochure given " to pt with Care Team information. Pt voiced understanding.     Pt's pharmacy: No Pharmacies Listed    Pt's preference for appointments:mornings

## 2019-07-29 NOTE — ASSESSMENT & PLAN NOTE
Secondary to obstructive pyelonephritis with ureteral stone and Proteus bacteremia.  Proteus Sepsis.  S/P cysto with stent placement.  Initially started on Vanc and Zosyn.  Vanc stopped.  Still requiring some Levophed.  Wean as tolerated.  Repeat BCx to document clearance.

## 2019-07-29 NOTE — ASSESSMENT & PLAN NOTE
We do not have previous lab work to which compared to chronicity of her renal failure but it is presumed to be acute in chronicity.    Probably secondary to sepsis and obstruction.  Improving with IVF's.

## 2019-07-29 NOTE — PROGRESS NOTES
[facility]  Cardiology  Progress Note    Patient Name: Dasia Abreu  MRN: [id number]  Admission Date: 7/27/2019  Hospital Length of Stay: 2 days  Code Status: Full Code   Attending Physician: Ministerio Crenshaw MD   Primary Care Physician: Primary Doctor No  Expected Discharge Date:   Principal Problem:Severe sepsis with septic shock    Subjective:     Patient extubated today.  States has occasional chest tightness.  Currently free of chest pain.    Past Medical History:   Diagnosis Date    Diabetes mellitus     Diverticulitis     Hypertension     Thyroid disease        Past Surgical History:   Procedure Laterality Date    BACK SURGERY      BLADDER SUSPENSION      CATARACT EXTRACTION, BILATERAL      CYSTOSCOPY, WITH URETERAL STENT INSERTION Bilateral 7/28/2019    Per[facility]    HYSTERECTOMY         Review of patient's allergies indicates:  No Known Allergies    No current facility-administered medications on file prior to encounter.      No current outpatient medications on file prior to encounter.     Family History     None        Tobacco Use    Smoking status: Never Smoker   Substance and Sexual Activity    Alcohol use: Never     Frequency: Never    Drug use: Never    Sexual activity: Not on file     Review of Systems   Constitution: Positive for malaise/fatigue.   HENT: Negative.    Eyes: Negative.    Cardiovascular: Positive for chest pain.   Respiratory: Negative.    Endocrine: Negative.    Hematologic/Lymphatic: Negative.    Skin: Negative.    Musculoskeletal: Negative.    Gastrointestinal: Negative.    Genitourinary: Negative.    Neurological: Negative.    Psychiatric/Behavioral: Negative.    Allergic/Immunologic: Negative.      Objective:     Vital Signs (Most Recent):  Temp: 98.2 °F (36.8 °C) (07/29/19 1200)  Pulse: 77 (07/29/19 1730)  Resp: (!) 27 (07/29/19 1730)  BP: (!) 111/55 (07/29/19 1730)  SpO2: 99 % (07/29/19 1730) Vital Signs (24h  Range):  Temp:  [98 °F (36.7 °C)-98.7 °F (37.1 °C)] 98.2 °F (36.8 °C)  Pulse:  [75-89] 77  Resp:  [18-41] 27  SpO2:  [92 %-100 %] 99 %  BP: ()/(43-68) 111/55  Arterial Line BP: ()/(39-75) 126/52     Weight: 81.9 kg (180 lb 8.9 oz)  Body mass index is 34.12 kg/m².    SpO2: 99 %  O2 Device (Oxygen Therapy): nasal cannula      Intake/Output Summary (Last 24 hours) at 7/29/2019 1759  Last data filed at 7/29/2019 1700  Gross per 24 hour   Intake 3395.1 ml   Output 2875 ml   Net 520.1 ml       Lines/Drains/Airways     Central Venous Catheter Line                 Percutaneous Central Line Insertion/Assessment - triple lumen  07/27/19 2220 right internal jugular 1 day          Drain                 Urethral Catheter 07/28/19 1045 Straight-tip 16 Fr. 1 day          Arterial Line                 Arterial Line 07/28/19 1006 Right Radial 1 day          Peripheral Intravenous Line                 Peripheral IV - Single Lumen 07/27/19 20 G Right Upper Arm 2 days                Physical Exam   Constitutional: She is oriented to person, place, and time. She appears well-developed and well-nourished.   HENT:   Head: Normocephalic.   Eyes: Pupils are equal, round, and reactive to light.   Neck: Normal range of motion. Neck supple.   Cardiovascular: Normal rate and regular rhythm.   Pulmonary/Chest: Effort normal and breath sounds normal.   Abdominal: Soft. Normal appearance and bowel sounds are normal. There is no tenderness.   Musculoskeletal: Normal range of motion.   Neurological: She is alert and oriented to person, place, and time.   Skin: Skin is warm.   Psychiatric: She has a normal mood and affect.   Nursing note and vitals reviewed.      Significant Labs:   BMP:   Recent Labs   Lab 07/28/19  0310 07/28/19  1153 07/28/19  2328 07/29/19  0528 07/29/19  1013   * 261* 353*  --  332*   * 137 139  --  139   K 3.9 4.3 3.7  --  3.1*    107 104  --  100   CO2 21* 13* 20*  --  26   BUN 29* 30* 30*  --   26*   CREATININE 2.4* 2.2* 1.9*  --  1.6*   CALCIUM 8.2* 8.2* 8.0*  --  7.8*   MG 0.9*  --   --  1.7 1.7   , CMP   Recent Labs   Lab 07/28/19  0310 07/28/19  1153 07/28/19  2328 07/29/19  1013   * 137 139 139   K 3.9 4.3 3.7 3.1*    107 104 100   CO2 21* 13* 20* 26   * 261* 353* 332*   BUN 29* 30* 30* 26*   CREATININE 2.4* 2.2* 1.9* 1.6*   CALCIUM 8.2* 8.2* 8.0* 7.8*   PROT 5.2* 6.2  --  5.6*   ALBUMIN 2.7* 2.9*  --  2.2*   BILITOT 0.8 0.7  --  1.0   ALKPHOS 43* 50*  --  59   AST 24 32  --  27   ALT 18 24  --  27   ANIONGAP 9 17* 15 13   ESTGFRAFRICA 23* 26* 31* 38*   EGFRNONAA 20* 22* 27* 33*   , CBC   Recent Labs   Lab 07/28/19  1153 07/29/19  0528 07/29/19  1013   WBC 21.83* 9.73 9.41   HGB 10.1* 9.0* 8.8*   HCT 32.4* 27.2* 26.6*   * 71* 66*   , INR No results for input(s): INR, PROTIME in the last 48 hours., Lipid Panel No results for input(s): CHOL, HDL, LDLCALC, TRIG, CHOLHDL in the last 48 hours., Troponin   Recent Labs   Lab 07/28/19  1638 07/28/19  2328 07/29/19  0528   TROPONINI 1.093* 1.170* 0.895*    and All pertinent lab results from the last 24 hours have been reviewed.    Significant Imaging: EKG was personally reviewed.    Assessment and Plan:         * Severe sepsis with septic shock  Presenting complaint.  Management per primary    Thrombocytopenia, unspecified        Elevated troponin  Significant troponin elevation in a patient with multiple risk factors for coronary artery disease, known nonobstructive coronary artery disease in the setting of acute renal failure and septic shock, in the absence of reported chest pain.    Continue to monitor closely.  Patient states that she had a CT angiogram done yesterday which showed nonobstructive coronary artery disease.  Start dual antiplatelet therapy when able.  Lovenox has been held because of thrombocytopenia.  No no large wall motion abnormalities on echo  Currently patient in septic shock and acute renal failure and  worsening thrombocytopenia, once these issues resolve, will consider coronary angiogram.     Coronary artery disease  As per chart, history of nonobstructive coronary artery disease.  Try to get records from patient's cardiologist    Type 2 diabetes mellitus  Management per primary    Essential hypertension  Hold all antihypertensives as patient currently in septic shock and requiring Levophed drip.        VTE Risk Mitigation (From admission, onward)        Ordered     Place sequential compression device  Until discontinued      07/29/19 1254     Place KANDICE hose  Until discontinued      07/29/19 1254     IP VTE LOW RISK PATIENT  Once      07/28/19 0018          Stephani Lofton MD  Cardiology  [facility]      Critical care time 35 min

## 2019-07-29 NOTE — ASSESSMENT & PLAN NOTE
Review of her previous medical records reveal that she is on metformin and mixed insulin therapy; will provide basal-prandial insulin therapy along with insulin sliding scale.   HgA1c of 8.4

## 2019-07-29 NOTE — PLAN OF CARE
Problem: Adult Inpatient Plan of Care  Goal: Plan of Care Review  Outcome: Ongoing (interventions implemented as appropriate)  Sedated, VSS, afebrile, in no apparent distress, following commands, nods head yes or no to simple questions, no visitors at bedside throughout the shift.   On continuous O2 sat and cardiac monitoring.  Telemetry reading NSR with occasional junctional beats noted.   On the ventilator with a 7.0 ETT taped 22cm at the lip.   Able to wean Levophed from 0.1 mcg/kg/min to 0.06 mcg/kg/min.  Sedated on the ventilator with Precedex and Diprivan gtt.  Rt IJ TLC intact - infusing sedation gtts along with sodium bicarbonate gtt.  On IV Abx.  Sosa catheter intact with clear, pink/yellow urine noted.  No BM this shift.  No skin breakdown.   Turned frequently throughout the shift.  SCDs on bilateral LE.   Will continue to monitor.

## 2019-07-29 NOTE — ASSESSMENT & PLAN NOTE
Secondary to severe acidosis  --abg with improved acidosis following source control and mechanical ventilation.   --SAT/SBT with anticipation of extubation

## 2019-07-30 LAB
ANION GAP SERPL CALC-SCNC: 11 MMOL/L (ref 8–16)
BASOPHILS # BLD AUTO: 0.02 K/UL (ref 0–0.2)
BASOPHILS NFR BLD: 0.2 % (ref 0–1.9)
BUN SERPL-MCNC: 24 MG/DL (ref 8–23)
CALCIUM SERPL-MCNC: 8 MG/DL (ref 8.7–10.5)
CHLORIDE SERPL-SCNC: 100 MMOL/L (ref 95–110)
CO2 SERPL-SCNC: 29 MMOL/L (ref 23–29)
CREAT SERPL-MCNC: 1.4 MG/DL (ref 0.5–1.4)
DIFFERENTIAL METHOD: ABNORMAL
EOSINOPHIL # BLD AUTO: 0.1 K/UL (ref 0–0.5)
EOSINOPHIL NFR BLD: 1.6 % (ref 0–8)
ERYTHROCYTE [DISTWIDTH] IN BLOOD BY AUTOMATED COUNT: 14.3 % (ref 11.5–14.5)
EST. GFR  (AFRICAN AMERICAN): 45 ML/MIN/1.73 M^2
EST. GFR  (NON AFRICAN AMERICAN): 39 ML/MIN/1.73 M^2
GLUCOSE SERPL-MCNC: 230 MG/DL (ref 70–110)
HCT VFR BLD AUTO: 25.4 % (ref 37–48.5)
HGB BLD-MCNC: 8.3 G/DL (ref 12–16)
LYMPHOCYTES # BLD AUTO: 0.8 K/UL (ref 1–4.8)
LYMPHOCYTES NFR BLD: 9.2 % (ref 18–48)
MAGNESIUM SERPL-MCNC: 1.9 MG/DL (ref 1.6–2.6)
MCH RBC QN AUTO: 30 PG (ref 27–31)
MCHC RBC AUTO-ENTMCNC: 32.7 G/DL (ref 32–36)
MCV RBC AUTO: 92 FL (ref 82–98)
MONOCYTES # BLD AUTO: 0.3 K/UL (ref 0.3–1)
MONOCYTES NFR BLD: 3.8 % (ref 4–15)
NEUTROPHILS # BLD AUTO: 7.3 K/UL (ref 1.8–7.7)
NEUTROPHILS NFR BLD: 85.8 % (ref 38–73)
PHOSPHATE SERPL-MCNC: 3.1 MG/DL (ref 2.7–4.5)
PLATELET # BLD AUTO: 81 K/UL (ref 150–350)
PMV BLD AUTO: 11.2 FL (ref 9.2–12.9)
POCT GLUCOSE: 220 MG/DL (ref 70–110)
POCT GLUCOSE: 221 MG/DL (ref 70–110)
POCT GLUCOSE: 226 MG/DL (ref 70–110)
POCT GLUCOSE: 229 MG/DL (ref 70–110)
POCT GLUCOSE: 231 MG/DL (ref 70–110)
POTASSIUM SERPL-SCNC: 3.2 MMOL/L (ref 3.5–5.1)
RBC # BLD AUTO: 2.77 M/UL (ref 4–5.4)
SODIUM SERPL-SCNC: 140 MMOL/L (ref 136–145)
WBC # BLD AUTO: 8.59 K/UL (ref 3.9–12.7)

## 2019-07-30 PROCEDURE — 80048 BASIC METABOLIC PNL TOTAL CA: CPT

## 2019-07-30 PROCEDURE — 25000003 PHARM REV CODE 250: Performed by: INTERNAL MEDICINE

## 2019-07-30 PROCEDURE — 25000003 PHARM REV CODE 250: Performed by: HOSPITALIST

## 2019-07-30 PROCEDURE — 99233 SBSQ HOSP IP/OBS HIGH 50: CPT | Mod: ,,, | Performed by: UROLOGY

## 2019-07-30 PROCEDURE — 63600175 PHARM REV CODE 636 W HCPCS: Performed by: INTERNAL MEDICINE

## 2019-07-30 PROCEDURE — 86022 PLATELET ANTIBODIES: CPT

## 2019-07-30 PROCEDURE — 36415 COLL VENOUS BLD VENIPUNCTURE: CPT

## 2019-07-30 PROCEDURE — 27000221 HC OXYGEN, UP TO 24 HOURS

## 2019-07-30 PROCEDURE — 99291 PR CRITICAL CARE, E/M 30-74 MINUTES: ICD-10-PCS | Mod: ,,, | Performed by: INTERNAL MEDICINE

## 2019-07-30 PROCEDURE — 63600175 PHARM REV CODE 636 W HCPCS: Performed by: UROLOGY

## 2019-07-30 PROCEDURE — 99233 PR SUBSEQUENT HOSPITAL CARE,LEVL III: ICD-10-PCS | Mod: ,,, | Performed by: UROLOGY

## 2019-07-30 PROCEDURE — 36410 VNPNXR 3YR/> PHY/QHP DX/THER: CPT

## 2019-07-30 PROCEDURE — 84100 ASSAY OF PHOSPHORUS: CPT

## 2019-07-30 PROCEDURE — S0028 INJECTION, FAMOTIDINE, 20 MG: HCPCS | Performed by: HOSPITALIST

## 2019-07-30 PROCEDURE — 21400001 HC TELEMETRY ROOM

## 2019-07-30 PROCEDURE — 99291 PR CRITICAL CARE, E/M 30-74 MINUTES: ICD-10-PCS | Mod: ,,, | Performed by: NURSE PRACTITIONER

## 2019-07-30 PROCEDURE — 83735 ASSAY OF MAGNESIUM: CPT

## 2019-07-30 PROCEDURE — 94761 N-INVAS EAR/PLS OXIMETRY MLT: CPT

## 2019-07-30 PROCEDURE — 99291 CRITICAL CARE FIRST HOUR: CPT | Mod: ,,, | Performed by: INTERNAL MEDICINE

## 2019-07-30 PROCEDURE — 99291 CRITICAL CARE FIRST HOUR: CPT | Mod: ,,, | Performed by: NURSE PRACTITIONER

## 2019-07-30 PROCEDURE — 85025 COMPLETE CBC W/AUTO DIFF WBC: CPT

## 2019-07-30 RX ORDER — NAPROXEN SODIUM 220 MG/1
81 TABLET, FILM COATED ORAL DAILY
Status: DISCONTINUED | OUTPATIENT
Start: 2019-07-31 | End: 2019-08-01 | Stop reason: HOSPADM

## 2019-07-30 RX ORDER — POTASSIUM CHLORIDE 20 MEQ/15ML
40 SOLUTION ORAL ONCE
Status: COMPLETED | OUTPATIENT
Start: 2019-07-30 | End: 2019-07-30

## 2019-07-30 RX ORDER — CLOPIDOGREL BISULFATE 75 MG/1
75 TABLET ORAL DAILY
Status: DISCONTINUED | OUTPATIENT
Start: 2019-07-31 | End: 2019-08-01 | Stop reason: HOSPADM

## 2019-07-30 RX ORDER — ASPIRIN 325 MG
325 TABLET ORAL ONCE
Status: COMPLETED | OUTPATIENT
Start: 2019-07-30 | End: 2019-07-30

## 2019-07-30 RX ORDER — NAPROXEN SODIUM 220 MG/1
325 TABLET, FILM COATED ORAL ONCE
Status: DISCONTINUED | OUTPATIENT
Start: 2019-07-30 | End: 2019-07-30

## 2019-07-30 RX ORDER — DULOXETIN HYDROCHLORIDE 30 MG/1
60 CAPSULE, DELAYED RELEASE ORAL NIGHTLY
Status: DISCONTINUED | OUTPATIENT
Start: 2019-07-30 | End: 2019-08-01 | Stop reason: HOSPADM

## 2019-07-30 RX ORDER — CLOPIDOGREL 300 MG/1
300 TABLET, FILM COATED ORAL ONCE
Status: COMPLETED | OUTPATIENT
Start: 2019-07-30 | End: 2019-07-30

## 2019-07-30 RX ADMIN — TRAMADOL HYDROCHLORIDE 50 MG: 50 TABLET, FILM COATED ORAL at 12:07

## 2019-07-30 RX ADMIN — POTASSIUM CHLORIDE 40 MEQ: 20 SOLUTION ORAL at 04:07

## 2019-07-30 RX ADMIN — FAMOTIDINE 20 MG: 10 INJECTION INTRAVENOUS at 08:07

## 2019-07-30 RX ADMIN — PIPERACILLIN AND TAZOBACTAM 4.5 G: 4; .5 INJECTION, POWDER, LYOPHILIZED, FOR SOLUTION INTRAVENOUS; PARENTERAL at 04:07

## 2019-07-30 RX ADMIN — ACETAMINOPHEN 650 MG: 325 TABLET, FILM COATED ORAL at 01:07

## 2019-07-30 RX ADMIN — DULOXETINE 60 MG: 30 CAPSULE, DELAYED RELEASE ORAL at 10:07

## 2019-07-30 RX ADMIN — CETIRIZINE HYDROCHLORIDE 5 MG: 5 TABLET ORAL at 08:07

## 2019-07-30 RX ADMIN — TRAMADOL HYDROCHLORIDE 50 MG: 50 TABLET, FILM COATED ORAL at 06:07

## 2019-07-30 RX ADMIN — INSULIN ASPART 4 UNITS: 100 INJECTION, SOLUTION INTRAVENOUS; SUBCUTANEOUS at 04:07

## 2019-07-30 RX ADMIN — CLOPIDOGREL BISULFATE 300 MG: 75 TABLET ORAL at 05:07

## 2019-07-30 RX ADMIN — INSULIN ASPART 4 UNITS: 100 INJECTION, SOLUTION INTRAVENOUS; SUBCUTANEOUS at 06:07

## 2019-07-30 RX ADMIN — PROMETHAZINE HYDROCHLORIDE 6.25 MG: 25 INJECTION INTRAMUSCULAR; INTRAVENOUS at 12:07

## 2019-07-30 RX ADMIN — ASPIRIN 325 MG ORAL TABLET 325 MG: 325 PILL ORAL at 05:07

## 2019-07-30 RX ADMIN — TRAMADOL HYDROCHLORIDE 50 MG: 50 TABLET, FILM COATED ORAL at 07:07

## 2019-07-30 RX ADMIN — PIPERACILLIN AND TAZOBACTAM 4.5 G: 4; .5 INJECTION, POWDER, LYOPHILIZED, FOR SOLUTION INTRAVENOUS; PARENTERAL at 09:07

## 2019-07-30 RX ADMIN — ACETAMINOPHEN 650 MG: 325 TABLET, FILM COATED ORAL at 12:07

## 2019-07-30 RX ADMIN — TRAMADOL HYDROCHLORIDE 50 MG: 50 TABLET, FILM COATED ORAL at 01:07

## 2019-07-30 RX ADMIN — PIPERACILLIN AND TAZOBACTAM 4.5 G: 4; .5 INJECTION, POWDER, LYOPHILIZED, FOR SOLUTION INTRAVENOUS; PARENTERAL at 12:07

## 2019-07-30 RX ADMIN — INSULIN ASPART 4 UNITS: 100 INJECTION, SOLUTION INTRAVENOUS; SUBCUTANEOUS at 11:07

## 2019-07-30 NOTE — ASSESSMENT & PLAN NOTE
Significant troponin elevation in a patient with multiple risk factors for coronary artery disease, known nonobstructive coronary artery disease in the setting of acute renal failure and septic shock.  Now complaining of on and off chest pain at rest.    Start dual antiplatelet therapy when able.  Lovenox has been held because of thrombocytopenia.  Hit panel sent      No no large wall motion abnormalities on echo    When cleared for dual antiplatelet therapy, load with Plavix 300 mg and plan for angiogram.

## 2019-07-30 NOTE — CARE UPDATE
[facility]  ICU Multidisciplinary Bedside Rounds     UPDATE     Date: 7/30/2019      Plan of care reviewed with the following, Nurse, Charge Nurse, Resp. Therapist, Infection Prevention, Dietician and .       Needs/ Goals for the day: Continues to have frontal HA with several doses of tramadol. Dr. Lofton wants ASA and plavix if ok with hospital medicine ([place] w/ urology said ok). Cath tomorrow.      Level of Care: Critical Care

## 2019-07-30 NOTE — NURSING
0820- Spoke with  for Dr. Plata to ask if patient could be loaded with ASA and plavix per Dr. Lofton's request. Stated she would relay message to Dr. Plata  0879-  Mami stated that Dr. Plata stated ok to load meds.

## 2019-07-30 NOTE — PLAN OF CARE
Problem: Adult Inpatient Plan of Care  Goal: Plan of Care Review  Outcome: Ongoing (interventions implemented as appropriate)  VSS. Right art line removed. Midline to be placed and TLC to be removed. Sosa with good urine output, remains with hematuria. Appetite still low, but better than yesterday and this morning. Encouraged movement in bed. Loaded with plavix and aspirin. [facility] tomorrow with Dr. Lofton consent in chart.

## 2019-07-30 NOTE — ASSESSMENT & PLAN NOTE
Review of her previous medical records reveal that she is on metformin and mixed insulin therapy; will provide basal-prandial insulin therapy along with insulin sliding scale.   HgA1c of 8.4%

## 2019-07-30 NOTE — ASSESSMENT & PLAN NOTE
Patient was unable to get extubated post procedure.  Unsure etiology.  Appreciate Pulmonary input.  Looks well and may be able to get extubated 7/29/2019

## 2019-07-30 NOTE — PLAN OF CARE
Problem: Adult Inpatient Plan of Care  Goal: Plan of Care Review  Outcome: Ongoing (interventions implemented as appropriate)  No acute changes; precedex turned off at the beginning of the shift - pt extubated early in the a.m. Pt c/o temporal HA - medicated with tramadol and tylenol/ phenergan for related nausea; unable to wean levophed from 0.05 mcgs- labile BP; afebrile; pt is A x O x 4 , but confused at times/ restless , repeats herself quite a bit; left door ajar - pt trying to use bedside phone for call light at times, but no acute mental status changes;  MAEW , turns self; NSR 70s-80s on the monitor ; Bps 100s with MAPS of 65-70; 2 + generalized edema UE and LE; O2 @ 2 liters/min NC, lungs clear; tolerating clear liquids; UOP /hr per khoury; instructed on POC and progression toward goals; continue to monitor.

## 2019-07-30 NOTE — ASSESSMENT & PLAN NOTE
Secondary to obstructive pyelonephritis with ureteral stone and Proteus bacteremia.  Proteus Sepsis.  S/P cysto with stent placement.  Initially started on Vanc and Zosyn.  Vanc stopped.  Weaned off Levophed.    Repeated BCx to document clearance.

## 2019-07-30 NOTE — ASSESSMENT & PLAN NOTE
Appreciate  input.  S/P cysto with stent placement on 7/28.  Sosa with bloody urine.  Follow  recs.  Held Lovenox at first as she was also thrombocytopenic.  SCD's and KANDICE's for DVT prophylaxis.

## 2019-07-30 NOTE — ASSESSMENT & PLAN NOTE
We do not have previous lab work to which compared to chronicity of her renal failure but it is presumed to be acute in chronicity.    Probably secondary to sepsis and obstruction.  Improved with IVF's

## 2019-07-30 NOTE — PROGRESS NOTES
[facility] Hospital Medicine  Progress Note    Patient Name: Dasia Abreu  MRN: [id number]  Patient Class: IP- Inpatient   Admission Date: 7/27/2019  Length of Stay: 3 days  Attending Physician: Michelle Henry MD  Primary Care Provider: Primary Doctor No        Subjective:     Principal Problem:Severe sepsis with septic shock      HPI:  Ms. Dasia Abreu is a 68 y.o. female from [place] with essential hypertension, type 2 diabetes mellitus (HbA1c unknown), CAD, and anemia chronic disease who presents to [facility] ED with complaints of diarrhea starting five days ago.  Her elderly mother lives with her in [place] and was driven into town five days ago to attend an appointment with her doctors.  Starting that they she started to watery stools that had resolved by the 2nd day.  Her friend recently bought a new car and she and her group of friends took the car to the [facility].  She started to feel very tired and lethargic and had chills once she arrived home.  She also complained of lower abdominal pain that started that day that was associated with urinary frequency; she denies any dysuria, hematuria, nor any urinary urgency.  Abdominal pain was nonradiating and was 8/10 in severity at its worst.  She also denies any chest pain, shortness of breath, palpitations, nor any diaphoresis.  She also reports right shoulder/right lower neck pain starting around the same time and started have a headache starting yesterday.  She denies any blurry vision.  She also denies any rashes nor joint pain. She has otherwise been in her usual state of health.    Overview/Hospital Course:  67 y/o female presented with septic shock secondary to obstructive pyelonephritis with ureteral stone and Proteus bacteremia.  Admitted to ICU on Levophed.  Urology consulted.  Patient underwent cysto with stent placement on 7/28.  Patient also noted to have elevated troponin, consistent with NSTEMI.  Cardiology consulted.   Also presented with ARF.  Patient unable to get extubated post procedure and Pulmonary consulted.  Initially started on Vanc and Zosyn.  Vanc stopped.  She was started on DAPT on 7/30/2019.  [facility] 7/31/2019.    Interval History:  Extubated. Feeling well. No new complaints.    Review of Systems   Constitutional: Negative for chills and fever.   HENT: Negative for trouble swallowing and voice change.    Respiratory: Negative for cough and shortness of breath.    Cardiovascular: Positive for leg swelling. Negative for chest pain.   Gastrointestinal: Negative for diarrhea and nausea.   Genitourinary: Positive for hematuria.   Skin: Negative for pallor and rash.   Psychiatric/Behavioral: Negative for agitation, behavioral problems and confusion.     Objective:     Vital Signs (Most Recent):  Temp: 98.2 °F (36.8 °C) (07/30/19 1200)  Pulse: 95 (07/30/19 1500)  Resp: 13 (07/30/19 1500)  BP: 131/63 (07/30/19 1200)  SpO2: 100 % (07/30/19 1500) Vital Signs (24h Range):  Temp:  [98 °F (36.7 °C)-98.4 °F (36.9 °C)] 98.2 °F (36.8 °C)  Pulse:  [] 95  Resp:  [13-36] 13  SpO2:  [90 %-100 %] 100 %  BP: ()/(47-66) 131/63  Arterial Line BP: ()/(39-84) 149/65     Weight: 81.9 kg (180 lb 8.9 oz)  Body mass index is 34.12 kg/m².    Intake/Output Summary (Last 24 hours) at 7/30/2019 1519  Last data filed at 7/30/2019 1205  Gross per 24 hour   Intake 1263.16 ml   Output 1960 ml   Net -696.84 ml      Physical Exam   Constitutional: She is oriented to person, place, and time. She appears well-developed and well-nourished. No distress.   HENT:   Right Ear: External ear normal.   Left Ear: External ear normal.   Nose: Nose normal.   Eyes: Pupils are equal, round, and reactive to light. EOM are normal. No scleral icterus.   Neck: Normal range of motion. Neck supple. No thyromegaly present.   Cardiovascular: Normal rate and normal heart sounds. Exam reveals no friction rub.   No murmur heard.  Pulmonary/Chest: Effort normal and  breath sounds normal. No respiratory distress. She has no wheezes. She has no rales.   Abdominal: Soft. Bowel sounds are normal. She exhibits no mass. There is no tenderness.   Obese   Genitourinary:   Genitourinary Comments: Tea colored urine in Sosa   Musculoskeletal: Normal range of motion. She exhibits edema (hands and feel). She exhibits no deformity.   Neurological: She is alert and oriented to person, place, and time. No cranial nerve deficit.   Skin: Skin is warm and dry. No pallor.       Significant Labs: All pertinent labs within the past 24 hours have been reviewed.    Significant Imaging: I have reviewed and interpreted all pertinent imaging results/findings within the past 24 hours.      Assessment/Plan:      * Severe sepsis with septic shock  Secondary to obstructive pyelonephritis with ureteral stone and Proteus bacteremia.  Proteus Sepsis.  S/P cysto with stent placement.  Initially started on Vanc and Zosyn.  Vanc stopped.  Weaned off Levophed.    Repeated BCx to document clearance.    Hypokalemia  Replace as needed.      Thrombocytopenia, unspecified  Secondary to sepsis.  Monitor     Acute respiratory failure with hypoxia  Patient was unable to get extubated post procedure.  Unsure etiology.  Appreciate Pulmonary input.  Looks well and may be able to get extubated 7/29/2019    Elevated troponin  See CAD    Obstructive pyelonephritis  Appreciate  input.  S/P cysto with stent placement on 7/28.  Sosa with bloody urine.  Follow  recs.  Held Lovenox at first as she was also thrombocytopenic.  SCD's and KANDICE's for DVT prophylaxis.      Anemia of chronic disease  Unfortunately we do not have previous lab work to which to compare to chronicity of her anemia.  There is no indication for transfusion at this time.  Monitor.    Coronary artery disease  She was noted on cardiac CT-A to have nonobstructive coronary artery disease in September 2018 and has been following up with a cardiologist in  [place].    Patient with elevated Troponin, consistent with NSTEMI.  This may be secondary to demand from septic shock.  Appreciate Cards input.    DAPT started on 7/30/2019.  [facility] 7/31/2019.    Type 2 diabetes mellitus  Review of her previous medical records reveal that she is on metformin and mixed insulin therapy; will provide basal-prandial insulin therapy along with insulin sliding scale.   HgA1c of 8.4%    Essential hypertension  Currently hypotensive.  Hold home BP medications.    Acute renal failure  We do not have previous lab work to which compared to chronicity of her renal failure but it is presumed to be acute in chronicity.    Probably secondary to sepsis and obstruction.  Improved with IVF's      VTE Risk Mitigation (From admission, onward)        Ordered     Place sequential compression device  Until discontinued      07/29/19 1254     Place KANDICE hose  Until discontinued      07/29/19 1254     IP VTE LOW RISK PATIENT  Once      07/28/19 0018          Critical care time spent on the evaluation and treatment of severe organ dysfunction, review of pertinent labs and imaging studies, discussions with consulting providers and discussions with patient/family: 25 minutes.      Michelle Henry MD  Department of Hospital Medicine   [facility]

## 2019-07-30 NOTE — ASSESSMENT & PLAN NOTE
She was noted on cardiac CT-A to have nonobstructive coronary artery disease in September 2018 and has been following up with a cardiologist in [place].    Patient with elevated Troponin, consistent with NSTEMI.  This may be secondary to demand from septic shock.  Appreciate Cards input.    DAPT started on 7/30/2019.  [facility] 7/31/2019.

## 2019-07-30 NOTE — SUBJECTIVE & OBJECTIVE
Objective:     Vital Signs (Most Recent):  Temp: 98.2 °F (36.8 °C) (07/30/19 1200)  Pulse: 101 (07/30/19 1315)  Resp: 14 (07/30/19 1315)  BP: 131/63 (07/30/19 1200)  SpO2: 100 % (07/30/19 1315) Vital Signs (24h Range):  Temp:  [98 °F (36.7 °C)-98.4 °F (36.9 °C)] 98.2 °F (36.8 °C)  Pulse:  [] 101  Resp:  [13-36] 14  SpO2:  [90 %-100 %] 100 %  BP: ()/(47-66) 131/63  Arterial Line BP: ()/(39-84) 138/61     Weight: 81.9 kg (180 lb 8.9 oz)  Body mass index is 34.12 kg/m².      Intake/Output Summary (Last 24 hours) at 7/30/2019 1432  Last data filed at 7/30/2019 1205  Gross per 24 hour   Intake 1358.96 ml   Output 1960 ml   Net -601.04 ml       Physical Exam   Constitutional: She is oriented to person, place, and time. She appears well-developed and well-nourished. No distress.   HENT:   Head: Normocephalic and atraumatic.   Eyes: Pupils are equal, round, and reactive to light. Conjunctivae and EOM are normal. Right eye exhibits no discharge. Left eye exhibits no discharge. No scleral icterus.   Neck: Normal range of motion.   Cardiovascular: Normal rate, regular rhythm, normal heart sounds and intact distal pulses. Exam reveals no gallop and no friction rub.   No murmur heard.  Pulmonary/Chest: Effort normal. No respiratory distress. She has no wheezes. She has no rales.   Abdominal: Soft. Bowel sounds are normal. She exhibits no distension. There is no tenderness.   Musculoskeletal: Normal range of motion. She exhibits no edema.   Neurological: She is alert and oriented to person, place, and time. No cranial nerve deficit or sensory deficit. GCS eye subscore is 4. GCS verbal subscore is 5.   Skin: Skin is warm and dry. Capillary refill takes 2 to 3 seconds.   Psychiatric: She has a normal mood and affect. Her behavior is normal. Thought content normal.       Vents:  Vent Mode: PRVC (07/29/19 0739)  Ventilator Initiated: Yes (07/28/19 1137)  Set Rate: 22 bmp (07/29/19 0739)  Vt Set: 450 mL (07/29/19  0739)  PEEP/CPAP: 5 cmH20 (07/29/19 0739)  Oxygen Concentration (%): 30 (07/29/19 1015)  Peak Airway Pressure: 26 cmH2O (07/29/19 0739)  Total Ve: 10.1 mL (07/29/19 0739)  F/VT Ratio<105 (RSBI): (!) 47.72 (07/29/19 0739)    Lines/Drains/Airways     Central Venous Catheter Line                 Percutaneous Central Line Insertion/Assessment - triple lumen  07/27/19 2220 right internal jugular 2 days          Drain                 Urethral Catheter 07/28/19 1045 Straight-tip 16 Fr. 2 days          Arterial Line                 Arterial Line 07/28/19 1006 Right Radial 2 days          Peripheral Intravenous Line                 Peripheral IV - Single Lumen 07/27/19 20 G Right Upper Arm 3 days                Significant Labs:    CBC/Anemia Profile:  Recent Labs   Lab 07/29/19 0528 07/29/19  1013 07/30/19  0203   WBC 9.73 9.41 8.59   HGB 9.0* 8.8* 8.3*   HCT 27.2* 26.6* 25.4*   PLT 71* 66* 81*   MCV 91 91 92   RDW 14.4 14.4 14.3        Chemistries:  Recent Labs   Lab 07/28/19 2328 07/29/19 0528 07/29/19  1013 07/30/19  0203     --  139 140   K 3.7  --  3.1* 3.2*     --  100 100   CO2 20*  --  26 29   BUN 30*  --  26* 24*   CREATININE 1.9*  --  1.6* 1.4   CALCIUM 8.0*  --  7.8* 8.0*   ALBUMIN  --   --  2.2*  --    PROT  --   --  5.6*  --    BILITOT  --   --  1.0  --    ALKPHOS  --   --  59  --    ALT  --   --  27  --    AST  --   --  27  --    MG  --  1.7 1.7 1.9   PHOS  --  1.4*  --  3.1       All pertinent labs within the past 24 hours have been reviewed.    Significant Imaging:  I have reviewed all pertinent imaging results/findings within the past 24 hours.

## 2019-07-30 NOTE — SUBJECTIVE & OBJECTIVE
Interval History:  Patient complains of occasional chest pressure.  Comes and goes on its own.  Denies orthopnea, PND, swelling of feet.  Platelets improving.    Review of Systems   Constitution: Positive for malaise/fatigue.   HENT: Negative.    Eyes: Negative.    Cardiovascular: Positive for chest pain.   Respiratory: Negative.    Endocrine: Negative.    Hematologic/Lymphatic: Negative.    Skin: Negative.    Musculoskeletal: Negative.    Gastrointestinal: Negative.    Genitourinary: Negative.    Neurological: Negative.    Psychiatric/Behavioral: Negative.    Allergic/Immunologic: Negative.      Objective:     Vital Signs (Most Recent):  Temp: 98.4 °F (36.9 °C) (07/30/19 0300)  Pulse: 97 (07/30/19 0801)  Resp: 18 (07/30/19 0801)  BP: (!) 96/54 (07/30/19 0400)  SpO2: 97 % (07/30/19 0801) Vital Signs (24h Range):  Temp:  [98 °F (36.7 °C)-98.4 °F (36.9 °C)] 98.4 °F (36.9 °C)  Pulse:  [] 97  Resp:  [13-41] 18  SpO2:  [90 %-100 %] 97 %  BP: ()/(43-60) 96/54  Arterial Line BP: ()/(39-65) 126/51     Weight: 81.9 kg (180 lb 8.9 oz)  Body mass index is 34.12 kg/m².     SpO2: 97 %  O2 Device (Oxygen Therapy): nasal cannula      Intake/Output Summary (Last 24 hours) at 7/30/2019 0819  Last data filed at 7/30/2019 0554  Gross per 24 hour   Intake 1396.11 ml   Output 2070 ml   Net -673.89 ml       Lines/Drains/Airways     Central Venous Catheter Line                 Percutaneous Central Line Insertion/Assessment - triple lumen  07/27/19 2220 right internal jugular 2 days          Drain                 Urethral Catheter 07/28/19 1045 Straight-tip 16 Fr. 1 day          Arterial Line                 Arterial Line 07/28/19 1006 Right Radial 1 day          Peripheral Intravenous Line                 Peripheral IV - Single Lumen 07/27/19 20 G Right Upper Arm 3 days                Physical Exam   Constitutional: She is oriented to person, place, and time. She appears well-developed and well-nourished.   HENT:   Head:  Normocephalic.   Eyes: Pupils are equal, round, and reactive to light.   Neck: Normal range of motion. Neck supple.   Cardiovascular: Normal rate and regular rhythm.   Pulmonary/Chest: Effort normal and breath sounds normal.   Abdominal: Soft. Normal appearance and bowel sounds are normal. There is no tenderness.   Musculoskeletal: Normal range of motion.   Neurological: She is alert and oriented to person, place, and time.   Skin: Skin is warm.   Psychiatric: She has a normal mood and affect.   Nursing note and vitals reviewed.      Significant Labs:   BMP:   Recent Labs   Lab 07/28/19  2328 07/29/19  0528 07/29/19  1013 07/30/19  0203   *  --  332* 230*     --  139 140   K 3.7  --  3.1* 3.2*     --  100 100   CO2 20*  --  26 29   BUN 30*  --  26* 24*   CREATININE 1.9*  --  1.6* 1.4   CALCIUM 8.0*  --  7.8* 8.0*   MG  --  1.7 1.7 1.9   , CMP   Recent Labs   Lab 07/28/19  1153 07/28/19  2328 07/29/19  1013 07/30/19  0203    139 139 140   K 4.3 3.7 3.1* 3.2*    104 100 100   CO2 13* 20* 26 29   * 353* 332* 230*   BUN 30* 30* 26* 24*   CREATININE 2.2* 1.9* 1.6* 1.4   CALCIUM 8.2* 8.0* 7.8* 8.0*   PROT 6.2  --  5.6*  --    ALBUMIN 2.9*  --  2.2*  --    BILITOT 0.7  --  1.0  --    ALKPHOS 50*  --  59  --    AST 32  --  27  --    ALT 24  --  27  --    ANIONGAP 17* 15 13 11   ESTGFRAFRICA 26* 31* 38* 45*   EGFRNONAA 22* 27* 33* 39*   , CBC   Recent Labs   Lab 07/29/19  0528 07/29/19  1013 07/30/19  0203   WBC 9.73 9.41 8.59   HGB 9.0* 8.8* 8.3*   HCT 27.2* 26.6* 25.4*   PLT 71* 66* 81*   , Lipid Panel No results for input(s): CHOL, HDL, LDLCALC, TRIG, CHOLHDL in the last 48 hours., Troponin   Recent Labs   Lab 07/28/19  1638 07/28/19  2328 07/29/19  0528   TROPONINI 1.093* 1.170* 0.895*    and All pertinent lab results from the last 24 hours have been reviewed.    Significant Imaging: ekg personally reviewed

## 2019-07-30 NOTE — SUBJECTIVE & OBJECTIVE
Interval History:  Extubated. Feeling well. No new complaints.    Review of Systems   Constitutional: Negative for chills and fever.   HENT: Negative for trouble swallowing and voice change.    Respiratory: Negative for cough and shortness of breath.    Cardiovascular: Positive for leg swelling. Negative for chest pain.   Gastrointestinal: Negative for diarrhea and nausea.   Genitourinary: Positive for hematuria.   Skin: Negative for pallor and rash.   Psychiatric/Behavioral: Negative for agitation, behavioral problems and confusion.     Objective:     Vital Signs (Most Recent):  Temp: 98.2 °F (36.8 °C) (07/30/19 1200)  Pulse: 95 (07/30/19 1500)  Resp: 13 (07/30/19 1500)  BP: 131/63 (07/30/19 1200)  SpO2: 100 % (07/30/19 1500) Vital Signs (24h Range):  Temp:  [98 °F (36.7 °C)-98.4 °F (36.9 °C)] 98.2 °F (36.8 °C)  Pulse:  [] 95  Resp:  [13-36] 13  SpO2:  [90 %-100 %] 100 %  BP: ()/(47-66) 131/63  Arterial Line BP: ()/(39-84) 149/65     Weight: 81.9 kg (180 lb 8.9 oz)  Body mass index is 34.12 kg/m².    Intake/Output Summary (Last 24 hours) at 7/30/2019 1519  Last data filed at 7/30/2019 1205  Gross per 24 hour   Intake 1263.16 ml   Output 1960 ml   Net -696.84 ml      Physical Exam   Constitutional: She is oriented to person, place, and time. She appears well-developed and well-nourished. No distress.   HENT:   Right Ear: External ear normal.   Left Ear: External ear normal.   Nose: Nose normal.   Eyes: Pupils are equal, round, and reactive to light. EOM are normal. No scleral icterus.   Neck: Normal range of motion. Neck supple. No thyromegaly present.   Cardiovascular: Normal rate and normal heart sounds. Exam reveals no friction rub.   No murmur heard.  Pulmonary/Chest: Effort normal and breath sounds normal. No respiratory distress. She has no wheezes. She has no rales.   Abdominal: Soft. Bowel sounds are normal. She exhibits no mass. There is no tenderness.   Obese   Genitourinary:    Genitourinary Comments: Tea colored urine in Sosa   Musculoskeletal: Normal range of motion. She exhibits edema (hands and feel). She exhibits no deformity.   Neurological: She is alert and oriented to person, place, and time. No cranial nerve deficit.   Skin: Skin is warm and dry. No pallor.       Significant Labs: All pertinent labs within the past 24 hours have been reviewed.    Significant Imaging: I have reviewed and interpreted all pertinent imaging results/findings within the past 24 hours.

## 2019-07-30 NOTE — ASSESSMENT & PLAN NOTE
(RESOLVED)  Secondary to bacteremia as blood culture with proteus from urosepsis.    --urine culture with gram neg rods as well  --S/p ureteral stent placement for source control  --Off levophed  --deescalate pip tazo for pan sensitive bacteria.   --repeat cultures 07/29 with NGTD

## 2019-07-30 NOTE — PROGRESS NOTES
LVM to call back and schedule AWV [facility]  Urology  Progress Note    Patient Name: Dasia Abreu  MRN: [id number]  Admission Date: 7/27/2019  Hospital Length of Stay: 3 days  Code Status: Full Code   Attending Provider: Michelle Henry MD   Primary Care Physician: Primary Doctor No    Subjective:     HPI:  Urolithiasis  Patient complains of right abdominal pain with radiation to the abdomen. Onset of symptoms was abrupt starting 2 days ago with unchanged course since that time. Patient describes the pain as aching, intermittent and rated as moderate. The patient has had nausea and vomiting and diaphoresis. There has been fever and chills. The patient is not complaining of dysuria or frequency. Risk factors for urolithiasis: none.        Interval History: Extubated and off pressors. Cr improving. Discussed urologic care plan with pt and family.     Review of Systems   Constitutional: Positive for fatigue. Negative for appetite change, chills and fever.   HENT: Negative for congestion, sore throat and trouble swallowing.    Eyes: Negative for pain and itching.   Respiratory: Negative for cough and shortness of breath.    Cardiovascular: Negative for chest pain, palpitations and leg swelling.   Gastrointestinal: Negative for abdominal distention, abdominal pain, constipation, diarrhea, nausea and vomiting.   Genitourinary: Negative for difficulty urinating, dysuria, flank pain, hematuria, nocturia and urgency.   Musculoskeletal: Negative for back pain, neck pain and neck stiffness.   Skin: Negative for rash and wound.   Neurological: Positive for headaches. Negative for dizziness and seizures.   Hematological: Negative for adenopathy. Does not bruise/bleed easily.   Psychiatric/Behavioral: Negative for confusion. The patient is not nervous/anxious.    All other systems reviewed and are negative.    Objective:     Temp:  [98 °F (36.7 °C)-98.4 °F (36.9 °C)] 98.4 °F (36.9 °C)  Pulse:  [] 93  Resp:  [13-41] 26  SpO2:  [90 %-100  %] 98 %  BP: ()/(43-60) 96/54  Arterial Line BP: ()/(39-65) 118/49     Body mass index is 34.12 kg/m².           Drains     Drain                 Urethral Catheter 07/28/19 1045 Straight-tip 16 Fr. 1 day                Physical Exam   Nursing note and vitals reviewed.  Constitutional: She appears well-developed and well-nourished. No distress.   HENT:   Head: Normocephalic and atraumatic.   Eyes: Conjunctivae are normal.   Neck: Normal range of motion. No tracheal deviation present. No thyromegaly present.   Cardiovascular: Normal rate, normal heart sounds and normal pulses.    Pulmonary/Chest: Effort normal and breath sounds normal. No respiratory distress.   Abdominal: Soft. She exhibits no distension and no mass. There is no hepatosplenomegaly. There is no tenderness. There is no rebound and no guarding. No hernia.   Genitourinary:   Genitourinary Comments: Sosa - light pink urine in bag, clearing in tubing   Musculoskeletal: Normal range of motion. She exhibits no edema or tenderness.   Lymphadenopathy:     She has no cervical adenopathy.   Neurological: She is alert.   Skin: Skin is warm and dry. No rash noted. No erythema.     Psychiatric: She has a normal mood and affect. Her behavior is normal.       Significant Labs:    BMP:  Recent Labs   Lab 07/28/19 2328 07/29/19  1013 07/30/19  0203    139 140   K 3.7 3.1* 3.2*    100 100   CO2 20* 26 29   BUN 30* 26* 24*   CREATININE 1.9* 1.6* 1.4   CALCIUM 8.0* 7.8* 8.0*       CBC:   Recent Labs   Lab 07/29/19  0528 07/29/19  1013 07/30/19  0203   WBC 9.73 9.41 8.59   HGB 9.0* 8.8* 8.3*   HCT 27.2* 26.6* 25.4*   PLT 71* 66* 81*       Blood Culture:   Recent Labs   Lab 07/27/19  1935 07/29/19  1013 07/29/19  1042   LABBLOO Gram stain aer bottle: Gram negative rods  Gram stain nilsa bottle: Gram negative rods  Results called to and read back by: ICU BRENNEN ROGER 07/28/2019  07:40  PROTEUS MIRABILIS  For susceptibility see order #[id number]  *  No Growth to date No Growth to date     Urine Culture:   Recent Labs   Lab 07/28/19  0110   LABURIN No significant isolate to date     Urine Studies:   Recent Labs   Lab 07/27/19  1942 07/28/19  0110   COLORU Yellow Red*   APPEARANCEUA Clear Hazy*   PHUR 5.0 7.0   SPECGRAV 1.015 1.020   PROTEINUA Negative 2+*   GLUCUA 1+* 1+*   KETONESU Negative Negative   BILIRUBINUA Negative Negative   OCCULTUA Negative 3+*   NITRITE Negative Negative   UROBILINOGEN Negative Negative   LEUKOCYTESUR Negative 3+*   RBCUA  --  >100*   WBCUA  --  45*   BACTERIA  --  Few*   HYALINECASTS  --  0       Significant Imaging:  All pertinent imaging results/findings from the past 24 hours have been reviewed.                  Assessment/Plan:     * Severe sepsis with septic shock  2/2 UTI/obstructing ureteral stone    Ureteral calculi  With UTI/sepsis  S/p stent placement  Will need treatment of stone in future when acute illness resolved    Obstructive pyelonephritis  2/2 R ureteral stone  S/p R ureteral stent placement by Dr Mendez 7/28/19    Acute renal failure  Stent placed  Improving        VTE Risk Mitigation (From admission, onward)        Ordered     Place sequential compression device  Until discontinued      07/29/19 1254     Place KANDICE hose  Until discontinued      07/29/19 1254     IP VTE LOW RISK PATIENT  Once      07/28/19 0018          Brittney Plata MD  Urology  [facility] [place]

## 2019-07-30 NOTE — PROGRESS NOTES
[facility]  Pulmonology  Progress Note    Patient Name: Dasia Abreu  MRN: [id number]  Admission Date: 7/27/2019  Hospital Length of Stay: 3 days  Code Status: Full Code  Attending Provider: Michelle Henry MD  Primary Care Provider: Primary Doctor No   Principal Problem: Severe sepsis with septic shock    Subjective:         Objective:     Vital Signs (Most Recent):  Temp: 98.2 °F (36.8 °C) (07/30/19 1200)  Pulse: 101 (07/30/19 1315)  Resp: 14 (07/30/19 1315)  BP: 131/63 (07/30/19 1200)  SpO2: 100 % (07/30/19 1315) Vital Signs (24h Range):  Temp:  [98 °F (36.7 °C)-98.4 °F (36.9 °C)] 98.2 °F (36.8 °C)  Pulse:  [] 101  Resp:  [13-36] 14  SpO2:  [90 %-100 %] 100 %  BP: ()/(47-66) 131/63  Arterial Line BP: ()/(39-84) 138/61     Weight: 81.9 kg (180 lb 8.9 oz)  Body mass index is 34.12 kg/m².      Intake/Output Summary (Last 24 hours) at 7/30/2019 1432  Last data filed at 7/30/2019 1205  Gross per 24 hour   Intake 1358.96 ml   Output 1960 ml   Net -601.04 ml       Physical Exam   Constitutional: She is oriented to person, place, and time. She appears well-developed and well-nourished. No distress.   HENT:   Head: Normocephalic and atraumatic.   Eyes: Pupils are equal, round, and reactive to light. Conjunctivae and EOM are normal. Right eye exhibits no discharge. Left eye exhibits no discharge. No scleral icterus.   Neck: Normal range of motion.   Cardiovascular: Normal rate, regular rhythm, normal heart sounds and intact distal pulses. Exam reveals no gallop and no friction rub.   No murmur heard.  Pulmonary/Chest: Effort normal. No respiratory distress. She has no wheezes. She has no rales.   Abdominal: Soft. Bowel sounds are normal. She exhibits no distension. There is no tenderness.   Musculoskeletal: Normal range of motion. She exhibits no edema.   Neurological: She is alert and oriented to person, place, and time. No cranial nerve deficit or sensory deficit. GCS eye subscore  is 4. GCS verbal subscore is 5.   Skin: Skin is warm and dry. Capillary refill takes 2 to 3 seconds.   Psychiatric: She has a normal mood and affect. Her behavior is normal. Thought content normal.       Vents:  Vent Mode: PRVC (07/29/19 0739)  Ventilator Initiated: Yes (07/28/19 1137)  Set Rate: 22 bmp (07/29/19 0739)  Vt Set: 450 mL (07/29/19 0739)  PEEP/CPAP: 5 cmH20 (07/29/19 0739)  Oxygen Concentration (%): 30 (07/29/19 1015)  Peak Airway Pressure: 26 cmH2O (07/29/19 0739)  Total Ve: 10.1 mL (07/29/19 0739)  F/VT Ratio<105 (RSBI): (!) 47.72 (07/29/19 0739)    Lines/Drains/Airways     Central Venous Catheter Line                 Percutaneous Central Line Insertion/Assessment - triple lumen  07/27/19 2220 right internal jugular 2 days          Drain                 Urethral Catheter 07/28/19 1045 Straight-tip 16 Fr. 2 days          Arterial Line                 Arterial Line 07/28/19 1006 Right Radial 2 days          Peripheral Intravenous Line                 Peripheral IV - Single Lumen 07/27/19 20 G Right Upper Arm 3 days                Significant Labs:    CBC/Anemia Profile:  Recent Labs   Lab 07/29/19 0528 07/29/19  1013 07/30/19  0203   WBC 9.73 9.41 8.59   HGB 9.0* 8.8* 8.3*   HCT 27.2* 26.6* 25.4*   PLT 71* 66* 81*   MCV 91 91 92   RDW 14.4 14.4 14.3        Chemistries:  Recent Labs   Lab 07/28/19 2328 07/29/19  0528 07/29/19  1013 07/30/19  0203     --  139 140   K 3.7  --  3.1* 3.2*     --  100 100   CO2 20*  --  26 29   BUN 30*  --  26* 24*   CREATININE 1.9*  --  1.6* 1.4   CALCIUM 8.0*  --  7.8* 8.0*   ALBUMIN  --   --  2.2*  --    PROT  --   --  5.6*  --    BILITOT  --   --  1.0  --    ALKPHOS  --   --  59  --    ALT  --   --  27  --    AST  --   --  27  --    MG  --  1.7 1.7 1.9   PHOS  --  1.4*  --  3.1       All pertinent labs within the past 24 hours have been reviewed.    Significant Imaging:  I have reviewed all pertinent imaging results/findings within the past 24  hours.    Assessment/Plan:     * Severe sepsis with septic shock  (RESOLVED)  Secondary to bacteremia as blood culture with proteus from urosepsis.    --urine culture with gram neg rods as well  --S/p ureteral stent placement for source control  --Off levophed  --deescalate pip tazo for pan sensitive bacteria.   --repeat cultures 07/29 with NGTD    Acute respiratory failure with hypoxia  Secondary to severe acidosis  --abg with improved acidosis following source control and mechanical ventilation.   --extubated to nasal canula and now on room air  --O2 sats >88% or better     Elevated troponin  Most likely demand ischemia.  Card is on board.      Acute renal failure  Secondary to nephrolithiasis causing obstruction and hydronephrosis  --s/p r uretal stent placement  --Good urine output noted  --keep MAP >65 for perfusion  --trend sCr       Above discussed with Dr. Iyer.  Critical Care time 30 minutes     Sam Boudreaux NP  Pulmonology  [facility] [place]

## 2019-07-30 NOTE — PROGRESS NOTES
[facility]  Cardiology  Progress Note    Patient Name: Dasia Abreu  MRN: [id number]  Admission Date: 7/27/2019  Hospital Length of Stay: 3 days  Code Status: Full Code   Attending Physician: Michelle Henry MD   Primary Care Physician: Primary Doctor No  Expected Discharge Date:   Principal Problem:Severe sepsis with septic shock    Subjective:       Interval History:  Patient complains of occasional chest pressure.  Comes and goes on its own.  Denies orthopnea, PND, swelling of feet.  Platelets improving.    Review of Systems   Constitution: Positive for malaise/fatigue.   HENT: Negative.    Eyes: Negative.    Cardiovascular: Positive for chest pain.   Respiratory: Negative.    Endocrine: Negative.    Hematologic/Lymphatic: Negative.    Skin: Negative.    Musculoskeletal: Negative.    Gastrointestinal: Negative.    Genitourinary: Negative.    Neurological: Negative.    Psychiatric/Behavioral: Negative.    Allergic/Immunologic: Negative.      Objective:     Vital Signs (Most Recent):  Temp: 98.4 °F (36.9 °C) (07/30/19 0300)  Pulse: 97 (07/30/19 0801)  Resp: 18 (07/30/19 0801)  BP: (!) 96/54 (07/30/19 0400)  SpO2: 97 % (07/30/19 0801) Vital Signs (24h Range):  Temp:  [98 °F (36.7 °C)-98.4 °F (36.9 °C)] 98.4 °F (36.9 °C)  Pulse:  [] 97  Resp:  [13-41] 18  SpO2:  [90 %-100 %] 97 %  BP: ()/(43-60) 96/54  Arterial Line BP: ()/(39-65) 126/51     Weight: 81.9 kg (180 lb 8.9 oz)  Body mass index is 34.12 kg/m².     SpO2: 97 %  O2 Device (Oxygen Therapy): nasal cannula      Intake/Output Summary (Last 24 hours) at 7/30/2019 0819  Last data filed at 7/30/2019 0554  Gross per 24 hour   Intake 1396.11 ml   Output 2070 ml   Net -673.89 ml       Lines/Drains/Airways     Central Venous Catheter Line                 Percutaneous Central Line Insertion/Assessment - triple lumen  07/27/19 2220 right internal jugular 2 days          Drain                 Urethral Catheter 07/28/19 1045  Straight-tip 16 Fr. 1 day          Arterial Line                 Arterial Line 07/28/19 1006 Right Radial 1 day          Peripheral Intravenous Line                 Peripheral IV - Single Lumen 07/27/19 20 G Right Upper Arm 3 days                Physical Exam   Constitutional: She is oriented to person, place, and time. She appears well-developed and well-nourished.   HENT:   Head: Normocephalic.   Eyes: Pupils are equal, round, and reactive to light.   Neck: Normal range of motion. Neck supple.   Cardiovascular: Normal rate and regular rhythm.   Pulmonary/Chest: Effort normal and breath sounds normal.   Abdominal: Soft. Normal appearance and bowel sounds are normal. There is no tenderness.   Musculoskeletal: Normal range of motion.   Neurological: She is alert and oriented to person, place, and time.   Skin: Skin is warm.   Psychiatric: She has a normal mood and affect.   Nursing note and vitals reviewed.      Significant Labs:   BMP:   Recent Labs   Lab 07/28/19  2328 07/29/19  0528 07/29/19  1013 07/30/19  0203   *  --  332* 230*     --  139 140   K 3.7  --  3.1* 3.2*     --  100 100   CO2 20*  --  26 29   BUN 30*  --  26* 24*   CREATININE 1.9*  --  1.6* 1.4   CALCIUM 8.0*  --  7.8* 8.0*   MG  --  1.7 1.7 1.9   , CMP   Recent Labs   Lab 07/28/19  1153 07/28/19  2328 07/29/19  1013 07/30/19  0203    139 139 140   K 4.3 3.7 3.1* 3.2*    104 100 100   CO2 13* 20* 26 29   * 353* 332* 230*   BUN 30* 30* 26* 24*   CREATININE 2.2* 1.9* 1.6* 1.4   CALCIUM 8.2* 8.0* 7.8* 8.0*   PROT 6.2  --  5.6*  --    ALBUMIN 2.9*  --  2.2*  --    BILITOT 0.7  --  1.0  --    ALKPHOS 50*  --  59  --    AST 32  --  27  --    ALT 24  --  27  --    ANIONGAP 17* 15 13 11   ESTGFRAFRICA 26* 31* 38* 45*   EGFRNONAA 22* 27* 33* 39*   , CBC   Recent Labs   Lab 07/29/19  0528 07/29/19  1013 07/30/19  0203   WBC 9.73 9.41 8.59   HGB 9.0* 8.8* 8.3*   HCT 27.2* 26.6* 25.4*   PLT 71* 66* 81*   , Lipid Panel No  results for input(s): CHOL, HDL, LDLCALC, TRIG, CHOLHDL in the last 48 hours., Troponin   Recent Labs   Lab 07/28/19  1638 07/28/19  2328 07/29/19  0528   TROPONINI 1.093* 1.170* 0.895*    and All pertinent lab results from the last 24 hours have been reviewed.    Significant Imaging: ekg personally reviewed    Assessment and Plan:         * Severe sepsis with septic shock  Presenting complaint.  Management per primary    Thrombocytopenia, unspecified  Improving.  Hit panel sent      Elevated troponin  Significant troponin elevation in a patient with multiple risk factors for coronary artery disease, known nonobstructive coronary artery disease in the setting of acute renal failure and septic shock.  Now complaining of on and off chest pain at rest.    Start dual antiplatelet therapy when able.  Lovenox has been held because of thrombocytopenia.  Hit panel sent      No no large wall motion abnormalities on echo    When cleared for dual antiplatelet therapy, load with Plavix 300 mg and plan for angiogram.    Coronary artery disease  As per chart, history of nonobstructive coronary artery disease.  Try to get records from patient's cardiologist    Type 2 diabetes mellitus  Management per primary    Essential hypertension  Antihypertensives on hold.  Blood pressure controlled        VTE Risk Mitigation (From admission, onward)        Ordered     Place sequential compression device  Until discontinued      07/29/19 1254     Place KANDICE hose  Until discontinued      07/29/19 1254     IP VTE LOW RISK PATIENT  Once      07/28/19 0018          Stephani Lofton MD  Cardiology  [facility]    Cc time 35 mins

## 2019-07-30 NOTE — PROVIDER TRANSFER
67 y/o female presented with septic shock secondary to obstructive pyelonephritis with ureteral stone and Proteus bacteremia.  Admitted to ICU on Levophed.  Urology consulted.  Patient underwent cysto with stent placement on 7/28.  Patient also noted to have elevated troponin, consistent with NSTEMI.  Cardiology consulted.  Also presented with ARF.  Patient unable to get extubated post procedure and Pulmonary consulted.  Initially started on Vanc and Zosyn.  Vanc stopped.  She was started on DAPT on 7/30/2019. Monitor hematuria.  [facility] 7/31/2019.  Consult PT/OT after cath.

## 2019-07-30 NOTE — NURSING TRANSFER
Nursing Transfer Note      7/30/2019     Transfer To: 324A    Transfer via bed    Transfer with cardiac monitor    Transported by transport    Medicines sent: insulin    Chart send with patient: Yes    Notified: spouse    Patient reassessed at: 7/30/19 1500    Upon arrival to floor: patient oriented to room, call bell in reach and bed in lowest position, tele box applied in ICU

## 2019-07-30 NOTE — ASSESSMENT & PLAN NOTE
Secondary to severe acidosis  --abg with improved acidosis following source control and mechanical ventilation.   --extubated to nasal canula and now on room air  --O2 sats >88% or better

## 2019-07-30 NOTE — CARE UPDATE
[facility]  ICU Multidisciplinary Bedside Rounds   SUMMARY      Date: 7/30/2019     Prehospitalization: Home  Admit Date / LOS : 7/27/2019/ 3 days     Diagnosis: Severe sepsis with septic shock     Consults:        Active: Cardio, Pulm CC, SW and Urology       Needed: N/A     Code Status: Full Code   Advanced Directive: <no information>     LDA: Art Line, Sosa, PIV, TLC        Central Lines/Site/Justification:Pressors off       Urinary Cath/Order/Justification:Place by Urology Service     Vasopressors/Infusions: none             CAM ICU: Positive  Pain Management: IV       Pain Controlled: yes      Rhythm: NSR (occasional junctional beats noted)     Respiratory Device: nasal cannula     VTE Prophylaxis: Mechanical  Mobility: Bedrest  Stress Ulcer Prophylaxis: Yes     Dietary:Clear liquid  Tolerance: not applicable  /  Advancement: no     Isolation: No active isolations     Restraints: No     Significant Dates:  Post Op Date: Cysto with stent placement 07/28  Rescue Date: N/A  Imaging/ Diagnostics: 7/28 CT of abdomen/pelvis; 7/27 CT of head     Noteworthy Labs:  K+ 3.2- Replacing K+ level with KCL 40meq          Needs from Care: Levophed and Precedex off. Transfer to floor Team:                                                                                                                                                                                                                        ICU LOS 1d 6h  Level of Care: Critical Care

## 2019-07-30 NOTE — NURSING
Report given to Laura, transport delayed due to midline placement. Then patient received dinner. Bed was no longer in epic then reassigned.   1708- Dr. Lofton at bedside for heart cath consent. Consent witnessed.

## 2019-07-30 NOTE — SUBJECTIVE & OBJECTIVE
Interval History: Extubated and off pressors. Cr improving. Discussed urologic care plan with pt and family.     Review of Systems   Constitutional: Positive for fatigue. Negative for appetite change, chills and fever.   HENT: Negative for congestion, sore throat and trouble swallowing.    Eyes: Negative for pain and itching.   Respiratory: Negative for cough and shortness of breath.    Cardiovascular: Negative for chest pain, palpitations and leg swelling.   Gastrointestinal: Negative for abdominal distention, abdominal pain, constipation, diarrhea, nausea and vomiting.   Genitourinary: Negative for difficulty urinating, dysuria, flank pain, hematuria, nocturia and urgency.   Musculoskeletal: Negative for back pain, neck pain and neck stiffness.   Skin: Negative for rash and wound.   Neurological: Positive for headaches. Negative for dizziness and seizures.   Hematological: Negative for adenopathy. Does not bruise/bleed easily.   Psychiatric/Behavioral: Negative for confusion. The patient is not nervous/anxious.    All other systems reviewed and are negative.    Objective:     Temp:  [98 °F (36.7 °C)-98.4 °F (36.9 °C)] 98.4 °F (36.9 °C)  Pulse:  [] 93  Resp:  [13-41] 26  SpO2:  [90 %-100 %] 98 %  BP: ()/(43-60) 96/54  Arterial Line BP: ()/(39-65) 118/49     Body mass index is 34.12 kg/m².           Drains     Drain                 Urethral Catheter 07/28/19 1045 Straight-tip 16 Fr. 1 day                Physical Exam   Nursing note and vitals reviewed.  Constitutional: She appears well-developed and well-nourished. No distress.   HENT:   Head: Normocephalic and atraumatic.   Eyes: Conjunctivae are normal.   Neck: Normal range of motion. No tracheal deviation present. No thyromegaly present.   Cardiovascular: Normal rate, normal heart sounds and normal pulses.    Pulmonary/Chest: Effort normal and breath sounds normal. No respiratory distress.   Abdominal: Soft. She exhibits no distension and no  mass. There is no hepatosplenomegaly. There is no tenderness. There is no rebound and no guarding. No hernia.   Genitourinary:   Genitourinary Comments: Sosa - light pink urine in bag, clearing in tubing   Musculoskeletal: Normal range of motion. She exhibits no edema or tenderness.   Lymphadenopathy:     She has no cervical adenopathy.   Neurological: She is alert.   Skin: Skin is warm and dry. No rash noted. No erythema.     Psychiatric: She has a normal mood and affect. Her behavior is normal.       Significant Labs:    BMP:  Recent Labs   Lab 07/28/19  2328 07/29/19  1013 07/30/19  0203    139 140   K 3.7 3.1* 3.2*    100 100   CO2 20* 26 29   BUN 30* 26* 24*   CREATININE 1.9* 1.6* 1.4   CALCIUM 8.0* 7.8* 8.0*       CBC:   Recent Labs   Lab 07/29/19  0528 07/29/19  1013 07/30/19  0203   WBC 9.73 9.41 8.59   HGB 9.0* 8.8* 8.3*   HCT 27.2* 26.6* 25.4*   PLT 71* 66* 81*       Blood Culture:   Recent Labs   Lab 07/27/19  1935 07/29/19  1013 07/29/19  1042   LABBLOO Gram stain aer bottle: Gram negative rods  Gram stain nilsa bottle: Gram negative rods  Results called to and read back by: ICU BRENNEN ORGER 07/28/2019  07:40  PROTEUS MIRABILIS  For susceptibility see order #[id number]  * No Growth to date No Growth to date     Urine Culture:   Recent Labs   Lab 07/28/19  0110   LABURIN No significant isolate to date     Urine Studies:   Recent Labs   Lab 07/27/19  1942 07/28/19  0110   COLORU Yellow Red*   APPEARANCEUA Clear Hazy*   PHUR 5.0 7.0   SPECGRAV 1.015 1.020   PROTEINUA Negative 2+*   GLUCUA 1+* 1+*   KETONESU Negative Negative   BILIRUBINUA Negative Negative   OCCULTUA Negative 3+*   NITRITE Negative Negative   UROBILINOGEN Negative Negative   LEUKOCYTESUR Negative 3+*   RBCUA  --  >100*   WBCUA  --  45*   BACTERIA  --  Few*   HYALINECASTS  --  0       Significant Imaging:  All pertinent imaging results/findings from the past 24 hours have been reviewed.

## 2019-07-30 NOTE — NURSING
Patient arrived to floor in stable condition. Visualized patient and assessed patient's overall condition and appearance. No complaints. Sister at bedside. NAD noted. Will continue to monitor.

## 2019-07-30 NOTE — ASSESSMENT & PLAN NOTE
Unfortunately we do not have previous lab work to which to compare to chronicity of her anemia.  There is no indication for transfusion at this time.  Monitor.

## 2019-07-31 LAB
1,25(OH)2D3 SERPL-MCNC: 32 PG/ML (ref 20–79)
ANION GAP SERPL CALC-SCNC: 12 MMOL/L (ref 8–16)
BACTERIA UR CULT: ABNORMAL
BASOPHILS # BLD AUTO: 0.03 K/UL (ref 0–0.2)
BASOPHILS NFR BLD: 0.4 % (ref 0–1.9)
BUN SERPL-MCNC: 17 MG/DL (ref 8–23)
CALCIUM SERPL-MCNC: 8.6 MG/DL (ref 8.7–10.5)
CHLORIDE SERPL-SCNC: 101 MMOL/L (ref 95–110)
CO2 SERPL-SCNC: 27 MMOL/L (ref 23–29)
CREAT SERPL-MCNC: 1.2 MG/DL (ref 0.5–1.4)
DIFFERENTIAL METHOD: ABNORMAL
EOSINOPHIL # BLD AUTO: 0.2 K/UL (ref 0–0.5)
EOSINOPHIL NFR BLD: 2.4 % (ref 0–8)
ERYTHROCYTE [DISTWIDTH] IN BLOOD BY AUTOMATED COUNT: 14.5 % (ref 11.5–14.5)
EST. GFR  (AFRICAN AMERICAN): 54 ML/MIN/1.73 M^2
EST. GFR  (NON AFRICAN AMERICAN): 47 ML/MIN/1.73 M^2
GLUCOSE SERPL-MCNC: 246 MG/DL (ref 70–110)
HCT VFR BLD AUTO: 25.2 % (ref 37–48.5)
HEPARIN INDUCED THROMBOCYTOPENIA: NORMAL
HGB BLD-MCNC: 7.8 G/DL (ref 12–16)
LYMPHOCYTES # BLD AUTO: 1.4 K/UL (ref 1–4.8)
LYMPHOCYTES NFR BLD: 15.9 % (ref 18–48)
MAGNESIUM SERPL-MCNC: 1.7 MG/DL (ref 1.6–2.6)
MCH RBC QN AUTO: 28.8 PG (ref 27–31)
MCHC RBC AUTO-ENTMCNC: 31 G/DL (ref 32–36)
MCV RBC AUTO: 93 FL (ref 82–98)
MONOCYTES # BLD AUTO: 0.7 K/UL (ref 0.3–1)
MONOCYTES NFR BLD: 8.4 % (ref 4–15)
NEUTROPHILS # BLD AUTO: 6.2 K/UL (ref 1.8–7.7)
NEUTROPHILS NFR BLD: 73.6 % (ref 38–73)
PHOSPHATE SERPL-MCNC: 2.8 MG/DL (ref 2.7–4.5)
PLATELET # BLD AUTO: 70 K/UL (ref 150–350)
PMV BLD AUTO: 10.3 FL (ref 9.2–12.9)
POCT GLUCOSE: 226 MG/DL (ref 70–110)
POCT GLUCOSE: 231 MG/DL (ref 70–110)
POCT GLUCOSE: 250 MG/DL (ref 70–110)
POCT GLUCOSE: 262 MG/DL (ref 70–110)
POCT GLUCOSE: 270 MG/DL (ref 70–110)
POTASSIUM SERPL-SCNC: 3.7 MMOL/L (ref 3.5–5.1)
RBC # BLD AUTO: 2.71 M/UL (ref 4–5.4)
SODIUM SERPL-SCNC: 140 MMOL/L (ref 136–145)
WBC # BLD AUTO: 8.47 K/UL (ref 3.9–12.7)

## 2019-07-31 PROCEDURE — 99153 MOD SED SAME PHYS/QHP EA: CPT | Performed by: INTERNAL MEDICINE

## 2019-07-31 PROCEDURE — 83735 ASSAY OF MAGNESIUM: CPT

## 2019-07-31 PROCEDURE — 99152 MOD SED SAME PHYS/QHP 5/>YRS: CPT | Mod: ,,, | Performed by: INTERNAL MEDICINE

## 2019-07-31 PROCEDURE — 25000003 PHARM REV CODE 250: Performed by: INTERNAL MEDICINE

## 2019-07-31 PROCEDURE — 85025 COMPLETE CBC W/AUTO DIFF WBC: CPT

## 2019-07-31 PROCEDURE — S0028 INJECTION, FAMOTIDINE, 20 MG: HCPCS | Performed by: INTERNAL MEDICINE

## 2019-07-31 PROCEDURE — 93458 PR CATH PLACE/CORON ANGIO, IMG SUPER/INTERP,W LEFT HEART VENTRICULOGRAPHY: ICD-10-PCS | Mod: 26,59,, | Performed by: INTERNAL MEDICINE

## 2019-07-31 PROCEDURE — C9600 PERC DRUG-EL COR STENT SING: HCPCS | Mod: LC | Performed by: INTERNAL MEDICINE

## 2019-07-31 PROCEDURE — C1769 GUIDE WIRE: HCPCS | Performed by: INTERNAL MEDICINE

## 2019-07-31 PROCEDURE — 63600175 PHARM REV CODE 636 W HCPCS: Performed by: INTERNAL MEDICINE

## 2019-07-31 PROCEDURE — 94761 N-INVAS EAR/PLS OXIMETRY MLT: CPT

## 2019-07-31 PROCEDURE — 21400001 HC TELEMETRY ROOM

## 2019-07-31 PROCEDURE — 92928 PRQ TCAT PLMT NTRAC ST 1 LES: CPT | Mod: LC,,, | Performed by: INTERNAL MEDICINE

## 2019-07-31 PROCEDURE — 25500020 PHARM REV CODE 255: Performed by: INTERNAL MEDICINE

## 2019-07-31 PROCEDURE — 27000221 HC OXYGEN, UP TO 24 HOURS

## 2019-07-31 PROCEDURE — 99152 PR MOD CONSCIOUS SEDATION, SAME PHYS, 5+ YRS, FIRST 15 MIN: ICD-10-PCS | Mod: ,,, | Performed by: INTERNAL MEDICINE

## 2019-07-31 PROCEDURE — 84100 ASSAY OF PHOSPHORUS: CPT

## 2019-07-31 PROCEDURE — C1887 CATHETER, GUIDING: HCPCS | Performed by: INTERNAL MEDICINE

## 2019-07-31 PROCEDURE — 27201423 OPTIME MED/SURG SUP & DEVICES STERILE SUPPLY: Performed by: INTERNAL MEDICINE

## 2019-07-31 PROCEDURE — 99152 MOD SED SAME PHYS/QHP 5/>YRS: CPT | Performed by: INTERNAL MEDICINE

## 2019-07-31 PROCEDURE — 80048 BASIC METABOLIC PNL TOTAL CA: CPT

## 2019-07-31 PROCEDURE — C1894 INTRO/SHEATH, NON-LASER: HCPCS | Performed by: INTERNAL MEDICINE

## 2019-07-31 PROCEDURE — C1874 STENT, COATED/COV W/DEL SYS: HCPCS | Performed by: INTERNAL MEDICINE

## 2019-07-31 PROCEDURE — 92928 PR STENT: ICD-10-PCS | Mod: LC,,, | Performed by: INTERNAL MEDICINE

## 2019-07-31 PROCEDURE — C1725 CATH, TRANSLUMIN NON-LASER: HCPCS | Performed by: INTERNAL MEDICINE

## 2019-07-31 PROCEDURE — 93458 L HRT ARTERY/VENTRICLE ANGIO: CPT | Mod: 59 | Performed by: INTERNAL MEDICINE

## 2019-07-31 PROCEDURE — 93458 L HRT ARTERY/VENTRICLE ANGIO: CPT | Mod: 26,59,, | Performed by: INTERNAL MEDICINE

## 2019-07-31 PROCEDURE — C1760 CLOSURE DEV, VASC: HCPCS | Performed by: INTERNAL MEDICINE

## 2019-07-31 DEVICE — STENT RONYX25022UX RESOLUTE ONYX 2.50X22
Type: IMPLANTABLE DEVICE | Site: CORONARY | Status: FUNCTIONAL
Brand: RESOLUTE ONYX™

## 2019-07-31 RX ORDER — FENTANYL CITRATE 50 UG/ML
INJECTION, SOLUTION INTRAMUSCULAR; INTRAVENOUS
Status: DISCONTINUED | OUTPATIENT
Start: 2019-07-31 | End: 2019-07-31 | Stop reason: HOSPADM

## 2019-07-31 RX ORDER — METOPROLOL TARTRATE 1 MG/ML
INJECTION, SOLUTION INTRAVENOUS
Status: DISCONTINUED | OUTPATIENT
Start: 2019-07-31 | End: 2019-07-31 | Stop reason: HOSPADM

## 2019-07-31 RX ORDER — CEFAZOLIN SODIUM 2 G/50ML
SOLUTION INTRAVENOUS
Status: DISCONTINUED | OUTPATIENT
Start: 2019-07-31 | End: 2019-08-01 | Stop reason: HOSPADM

## 2019-07-31 RX ORDER — FUROSEMIDE 10 MG/ML
INJECTION INTRAMUSCULAR; INTRAVENOUS
Status: DISCONTINUED | OUTPATIENT
Start: 2019-07-31 | End: 2019-07-31 | Stop reason: HOSPADM

## 2019-07-31 RX ORDER — LIDOCAINE HYDROCHLORIDE 10 MG/ML
INJECTION, SOLUTION EPIDURAL; INFILTRATION; INTRACAUDAL; PERINEURAL
Status: DISCONTINUED | OUTPATIENT
Start: 2019-07-31 | End: 2019-07-31 | Stop reason: HOSPADM

## 2019-07-31 RX ORDER — HYDROCODONE BITARTRATE AND ACETAMINOPHEN 5; 325 MG/1; MG/1
1 TABLET ORAL EVERY 4 HOURS PRN
Status: DISCONTINUED | OUTPATIENT
Start: 2019-07-31 | End: 2019-08-01 | Stop reason: HOSPADM

## 2019-07-31 RX ORDER — MIDAZOLAM HYDROCHLORIDE 1 MG/ML
INJECTION, SOLUTION INTRAMUSCULAR; INTRAVENOUS
Status: DISCONTINUED | OUTPATIENT
Start: 2019-07-31 | End: 2019-07-31 | Stop reason: HOSPADM

## 2019-07-31 RX ORDER — OXYCODONE HYDROCHLORIDE 5 MG/1
10 TABLET ORAL EVERY 4 HOURS PRN
Status: DISCONTINUED | OUTPATIENT
Start: 2019-07-31 | End: 2019-08-01 | Stop reason: HOSPADM

## 2019-07-31 RX ADMIN — CETIRIZINE HYDROCHLORIDE 5 MG: 5 TABLET ORAL at 08:07

## 2019-07-31 RX ADMIN — CLOPIDOGREL BISULFATE 75 MG: 75 TABLET ORAL at 08:07

## 2019-07-31 RX ADMIN — ACETAMINOPHEN 650 MG: 325 TABLET, FILM COATED ORAL at 12:07

## 2019-07-31 RX ADMIN — PIPERACILLIN AND TAZOBACTAM 4.5 G: 4; .5 INJECTION, POWDER, LYOPHILIZED, FOR SOLUTION INTRAVENOUS; PARENTERAL at 08:07

## 2019-07-31 RX ADMIN — PIPERACILLIN AND TAZOBACTAM 4.5 G: 4; .5 INJECTION, POWDER, LYOPHILIZED, FOR SOLUTION INTRAVENOUS; PARENTERAL at 04:07

## 2019-07-31 RX ADMIN — PIPERACILLIN AND TAZOBACTAM 4.5 G: 4; .5 INJECTION, POWDER, LYOPHILIZED, FOR SOLUTION INTRAVENOUS; PARENTERAL at 02:07

## 2019-07-31 RX ADMIN — ACETAMINOPHEN 650 MG: 325 TABLET, FILM COATED ORAL at 08:07

## 2019-07-31 RX ADMIN — ACETAMINOPHEN 650 MG: 325 TABLET, FILM COATED ORAL at 05:07

## 2019-07-31 RX ADMIN — INSULIN ASPART 2 UNITS: 100 INJECTION, SOLUTION INTRAVENOUS; SUBCUTANEOUS at 08:07

## 2019-07-31 RX ADMIN — ASPIRIN 81 MG 81 MG: 81 TABLET ORAL at 08:07

## 2019-07-31 RX ADMIN — DULOXETINE 60 MG: 30 CAPSULE, DELAYED RELEASE ORAL at 08:07

## 2019-07-31 RX ADMIN — INSULIN ASPART 6 UNITS: 100 INJECTION, SOLUTION INTRAVENOUS; SUBCUTANEOUS at 05:07

## 2019-07-31 RX ADMIN — FAMOTIDINE 20 MG: 10 INJECTION INTRAVENOUS at 08:07

## 2019-07-31 NOTE — PLAN OF CARE
Problem: Diabetes Comorbidity  Goal: Blood Glucose Level Within Desired Range    Intervention: Maintain Glycemic Control     07/31/19 4388   Monitor and Manage Ketoacidosis   Glycemic Management blood glucose monitoring

## 2019-07-31 NOTE — ASSESSMENT & PLAN NOTE
Secondary to obstructive pyelonephritis with ureteral stone and Proteus bacteremia.  Proteus Sepsis.  S/P cysto with stent placement.  Initially started on Vanc and Zosyn.  Vanc stopped.  Weaned off Levophed.    Repeated BCx to document clearance.  Zosyn for now 2 weeks from 7/29

## 2019-07-31 NOTE — BRIEF OP NOTE
[facility]  Brief Operative Note     SUMMARY     Surgery Date: 7/31/2019     Surgeon(s) and Role:     * Stephani Lofton MD - Primary    Assisting Surgeon: None    Pre-op Diagnosis:  Elevated troponin [R74.8]  NSTEMI (non-ST elevated myocardial infarction) [I21.4]    Post-op Diagnosis:  Post-Op Diagnosis Codes:     * Elevated troponin [R74.8]     * NSTEMI (non-ST elevated myocardial infarction) [I21.4]    Patient with non-STEMI.  Kept on having on and off chest pain including last night. Risks, benefits and alternatives of the catheterization procedure were discussed with the patient which include but are not limited to: bleeding, infection, death, heart attack, arrhythmia, kidney failure, stroke, need for emergency surgery, hematoma, pseudoaneurysm etc.  Patient understands and and agrees to proceed.  Consent was placed on the chart.      Procedure(s) (LRB):  Left heart cath, radial  (Left)  Angioplasty, Coronary Artery, With Stent Insertion (N/A)  Stent, Drug Eluting, Single Vessel, Coronary    Anesthesia: RN IV Sedation    Description of the findings of the procedure:   Culprit vessel was proximal to mid OM1 with severe 90% stenosis.  Successful PCI of OM1 with drug-eluting stent x1    Findings/Key Components:  LVEDP: 26 mmHg      Dominance: Codominant  LM: short main  LAD: mid lad 30-40% stenosis  LCx:  This was the culprit vessel.  OM1 has a proximal to mid long stenosis with tandem 90% stenosis.  Successful PCI with 2.5 into 22 mm drug-eluting stent, post dilated to 2.75 mm with a noncompliant balloon.  RCA:  Distal RCA has a long 50-60% stenosis.  This is a 1.5 mm vessel in that segment    Hemostasis:  Angiomax used for anticoagulation during case  RFA Perclose    Impression:  Non-STEMI with ongoing pain  PCI of culprit lesion which was circumflex with drug-eluting stent x1    Plan:  Aspirin 81 mg daily indefinitely  Plavix 75 mg daily for at least 1 year uninterrupted  Aggressive risk  factor modification  Follow-up in Cardiology Clinic    Estimated Blood Loss: < 10 cc       Specimens: None

## 2019-07-31 NOTE — PROGRESS NOTES
[facility] Hospital Medicine  Progress Note    Patient Name: Dsaia Abreu  MRN: [id number]  Patient Class: IP- Inpatient   Admission Date: 7/27/2019  Length of Stay: 4 days  Attending Physician: Miguelito Enriquez MD  Primary Care Provider: Primary Doctor No        Subjective:     Principal Problem:Severe sepsis with septic shock      HPI:  Ms. Dasia Abreu is a 68 y.o. female from [place] with essential hypertension, type 2 diabetes mellitus (HbA1c unknown), CAD, and anemia chronic disease who presents to [facility] ED with complaints of diarrhea starting five days ago.  Her elderly mother lives with her in [place] and was driven into town five days ago to attend an appointment with her doctors.  Starting that they she started to watery stools that had resolved by the 2nd day.  Her friend recently bought a new car and she and her group of friends took the car to the [facility].  She started to feel very tired and lethargic and had chills once she arrived home.  She also complained of lower abdominal pain that started that day that was associated with urinary frequency; she denies any dysuria, hematuria, nor any urinary urgency.  Abdominal pain was nonradiating and was 8/10 in severity at its worst.  She also denies any chest pain, shortness of breath, palpitations, nor any diaphoresis.  She also reports right shoulder/right lower neck pain starting around the same time and started have a headache starting yesterday.  She denies any blurry vision.  She also denies any rashes nor joint pain. She has otherwise been in her usual state of health.    Overview/Hospital Course:  69 y/o female presented with septic shock secondary to obstructive pyelonephritis with ureteral stone and Proteus bacteremia.  Admitted to ICU on Levophed.  Urology consulted.  Patient underwent cysto with stent placement on 7/28.  Patient also noted to have elevated troponin, consistent with NSTEMI.  Cardiology consulted.   Also presented with ARF.  Patient unable to get extubated post procedure and Pulmonary consulted.  Initially started on Vanc and Zosyn.  Vanc stopped.  She was started on DAPT on 7/30/2019.  [facility] 7/31/2019. Culprit vessel was proximal to mid OM1 with severe 90% stenosis.  Successful PCI of OM1 with drug-eluting stent x1.  PT/OT consulted on 7/31 for an eval. Renal function improved.    Interval History: No new issues. Back from [facility].    Review of Systems   Constitutional: Negative for chills.   Respiratory: Negative for apnea and shortness of breath.    Cardiovascular: Negative for chest pain.   Gastrointestinal: Negative for abdominal distention.   Genitourinary: Negative for difficulty urinating.   Musculoskeletal: Negative for arthralgias.   Skin: Negative for color change.     Objective:     Vital Signs (Most Recent):  Temp: 98.1 °F (36.7 °C) (07/31/19 0738)  Pulse: 98 (07/31/19 0738)  Resp: 16 (07/31/19 0738)  BP: (!) 149/69 (07/31/19 0738)  SpO2: 98 % (07/31/19 0738) Vital Signs (24h Range):  Temp:  [98.1 °F (36.7 °C)-98.9 °F (37.2 °C)] 98.1 °F (36.7 °C)  Pulse:  [] 98  Resp:  [13-25] 16  SpO2:  [96 %-100 %] 98 %  BP: (138-149)/(60-69) 149/69  Arterial Line BP: (137-168)/(60-70) 137/60     Weight: 81.9 kg (180 lb 8.9 oz)  Body mass index is 34.12 kg/m².    Intake/Output Summary (Last 24 hours) at 7/31/2019 1205  Last data filed at 7/31/2019 0508  Gross per 24 hour   Intake 50 ml   Output 1800 ml   Net -1750 ml      Physical Exam   Constitutional: She is oriented to person, place, and time. She appears well-developed and well-nourished.   HENT:   Head: Normocephalic and atraumatic.   Cardiovascular: Normal rate and regular rhythm.   Neurological: She is alert and oriented to person, place, and time.   Psychiatric: She has a normal mood and affect. Her behavior is normal.       Significant Labs:   BMP:   Recent Labs   Lab 07/31/19  0434   *      K 3.7      CO2 27   BUN 17   CREATININE 1.2    CALCIUM 8.6*   MG 1.7     CBC:   Recent Labs   Lab 07/30/19  0203 07/31/19  0434   WBC 8.59 8.47   HGB 8.3* 7.8*   HCT 25.4* 25.2*   PLT 81* 70*             Assessment/Plan:      * Severe sepsis with septic shock  Secondary to obstructive pyelonephritis with ureteral stone and Proteus bacteremia.  Proteus Sepsis.  S/P cysto with stent placement.  Initially started on Vanc and Zosyn.  Vanc stopped.  Weaned off Levophed.    Repeated BCx to document clearance.  Zosyn for now 2 weeks from 7/29    Hypokalemia  Replace as needed.      Thrombocytopenia, unspecified  Secondary to sepsis.  Monitor     Acute respiratory failure with hypoxia  Patient was unable to get extubated post procedure.  Unsure etiology.  Appreciate Pulmonary input.  Looks well and may be able to get extubated 7/29/2019    Elevated troponin  See CAD    Obstructive pyelonephritis  Appreciate  input.  S/P cysto with stent placement on 7/28.  Sosa with bloody urine.  Follow  recs.  Held Lovenox at first as she was also thrombocytopenic.  SCD's and KANDICE's for DVT prophylaxis.      Anemia of chronic disease  Unfortunately we do not have previous lab work to which to compare to chronicity of her anemia.  There is no indication for transfusion at this time.  Monitor.    Coronary artery disease  She was noted on cardiac CT-A to have nonobstructive coronary artery disease in September 2018 and has been following up with a cardiologist in [place].    Patient with elevated Troponin, consistent with NSTEMI.  This may be secondary to demand from septic shock.  Appreciate Cards input.    DAPT started on 7/30/2019.  [facility] 7/31/2019- Culprit vessel was proximal to mid OM1 with severe 90% stenosis.  Successful PCI of OM1 with drug-eluting stent x1    Type 2 diabetes mellitus  Review of her previous medical records reveal that she is on metformin and mixed insulin therapy; will provide basal-prandial insulin therapy along with insulin sliding scale.   HgA1c of  8.4%    Essential hypertension  Currently hypotensive.  Hold home BP medications.    Acute renal failure  We do not have previous lab work to which compared to chronicity of her renal failure but it is presumed to be acute in chronicity.    Probably secondary to sepsis and obstruction.  Improved with IVF's    weakness- PT/OT consult.     VTE Risk Mitigation (From admission, onward)        Ordered     Place sequential compression device  Until discontinued      07/29/19 1254     Place KANDICE hose  Until discontinued      07/29/19 1254     IP VTE LOW RISK PATIENT  Once      07/28/19 0018          PT/OT consult. Monitor overnight. May be able to go home with H/H on 8/1. 2 weeks of cipro from 7/29.         Miguelito Douglas MD  Department of Hospital Medicine   [facility]

## 2019-07-31 NOTE — NURSING
Patient arrived to floor in stable condition from cath lab. Visualized patient and assessed patient's overall condition and appearance. No complaints. NAD noted. Frequent checks and VS in progress. Right perclose in place to right groin, blood saturated to dressing, reinforced, no redness, swelling or hematoma noted. Will continue to monitor for changes.

## 2019-07-31 NOTE — NURSING
Patient bedrest complete, gauze to right groin saturated, replaced gauze to right groin site, held pressure per Dr. Lofton.

## 2019-07-31 NOTE — PLAN OF CARE
Problem: Fall Injury Risk  Goal: Absence of Fall and Fall-Related Injury    Intervention: Identify and Manage Contributors to Fall Injury Risk     07/31/19 1835   Manage Acute Allergic Reaction   Medication Review/Management medications reviewed   Identify and Manage Contributors to Fall Injury Risk   Self-Care Promotion meal setup provided;independence encouraged     Intervention: Promote Injury-Free Environment     07/31/19 1835   Optimize [place] and Functional Mobility   Environmental Safety Modification assistive device/personal items within reach;clutter free environment maintained;room near unit station   Optimize Balance and Safe Activity   Safety Promotion/Fall Prevention assistive device/personal item within reach;bed alarm set;nonskid shoes/socks when out of bed;side rails raised x 2;instructed to call staff for mobility;medications reviewed         Problem: Diabetes Comorbidity  Goal: Blood Glucose Level Within Desired Range    Intervention: Maintain Glycemic Control     07/31/19 1835   Monitor and Manage Ketoacidosis   Glycemic Management supplemental insulin given

## 2019-07-31 NOTE — ASSESSMENT & PLAN NOTE
She was noted on cardiac CT-A to have nonobstructive coronary artery disease in September 2018 and has been following up with a cardiologist in [place].    Patient with elevated Troponin, consistent with NSTEMI.  This may be secondary to demand from septic shock.  Appreciate Cards input.    DAPT started on 7/30/2019.  [facility] 7/31/2019- Culprit vessel was proximal to mid OM1 with severe 90% stenosis.  Successful PCI of OM1 with drug-eluting stent x1

## 2019-07-31 NOTE — SUBJECTIVE & OBJECTIVE
Interval History: No new issues. Back from [facility].    Review of Systems   Constitutional: Negative for chills.   Respiratory: Negative for apnea and shortness of breath.    Cardiovascular: Negative for chest pain.   Gastrointestinal: Negative for abdominal distention.   Genitourinary: Negative for difficulty urinating.   Musculoskeletal: Negative for arthralgias.   Skin: Negative for color change.     Objective:     Vital Signs (Most Recent):  Temp: 98.1 °F (36.7 °C) (07/31/19 0738)  Pulse: 98 (07/31/19 0738)  Resp: 16 (07/31/19 0738)  BP: (!) 149/69 (07/31/19 0738)  SpO2: 98 % (07/31/19 0738) Vital Signs (24h Range):  Temp:  [98.1 °F (36.7 °C)-98.9 °F (37.2 °C)] 98.1 °F (36.7 °C)  Pulse:  [] 98  Resp:  [13-25] 16  SpO2:  [96 %-100 %] 98 %  BP: (138-149)/(60-69) 149/69  Arterial Line BP: (137-168)/(60-70) 137/60     Weight: 81.9 kg (180 lb 8.9 oz)  Body mass index is 34.12 kg/m².    Intake/Output Summary (Last 24 hours) at 7/31/2019 1205  Last data filed at 7/31/2019 0508  Gross per 24 hour   Intake 50 ml   Output 1800 ml   Net -1750 ml      Physical Exam   Constitutional: She is oriented to person, place, and time. She appears well-developed and well-nourished.   HENT:   Head: Normocephalic and atraumatic.   Cardiovascular: Normal rate and regular rhythm.   Neurological: She is alert and oriented to person, place, and time.   Psychiatric: She has a normal mood and affect. Her behavior is normal.       Significant Labs:   BMP:   Recent Labs   Lab 07/31/19  0434   *      K 3.7      CO2 27   BUN 17   CREATININE 1.2   CALCIUM 8.6*   MG 1.7     CBC:   Recent Labs   Lab 07/30/19  0203 07/31/19  0434   WBC 8.59 8.47   HGB 8.3* 7.8*   HCT 25.4* 25.2*   PLT 81* 70*

## 2019-08-01 VITALS
HEART RATE: 101 BPM | SYSTOLIC BLOOD PRESSURE: 141 MMHG | TEMPERATURE: 99 F | WEIGHT: 180.56 LBS | BODY MASS INDEX: 34.09 KG/M2 | OXYGEN SATURATION: 95 % | RESPIRATION RATE: 19 BRPM | HEIGHT: 61 IN | DIASTOLIC BLOOD PRESSURE: 64 MMHG

## 2019-08-01 PROBLEM — R79.89 ELEVATED TROPONIN: Status: RESOLVED | Noted: 2019-07-28 | Resolved: 2019-08-01

## 2019-08-01 PROBLEM — A41.9 SEVERE SEPSIS WITH SEPTIC SHOCK: Status: RESOLVED | Noted: 2019-07-27 | Resolved: 2019-08-01

## 2019-08-01 PROBLEM — J96.01 ACUTE RESPIRATORY FAILURE WITH HYPOXIA: Status: RESOLVED | Noted: 2019-07-28 | Resolved: 2019-08-01

## 2019-08-01 PROBLEM — D69.6 THROMBOCYTOPENIA, UNSPECIFIED: Status: RESOLVED | Noted: 2019-07-29 | Resolved: 2019-08-01

## 2019-08-01 PROBLEM — N11.1 OBSTRUCTIVE PYELONEPHRITIS: Status: RESOLVED | Noted: 2019-07-28 | Resolved: 2019-08-01

## 2019-08-01 PROBLEM — E87.6 HYPOKALEMIA: Status: RESOLVED | Noted: 2019-07-29 | Resolved: 2019-08-01

## 2019-08-01 PROBLEM — R65.21 SEVERE SEPSIS WITH SEPTIC SHOCK: Status: RESOLVED | Noted: 2019-07-27 | Resolved: 2019-08-01

## 2019-08-01 PROBLEM — I21.4 NSTEMI (NON-ST ELEVATED MYOCARDIAL INFARCTION): Status: ACTIVE | Noted: 2019-08-01

## 2019-08-01 PROBLEM — N17.9 ACUTE RENAL FAILURE: Status: RESOLVED | Noted: 2019-07-28 | Resolved: 2019-08-01

## 2019-08-01 LAB
ANION GAP SERPL CALC-SCNC: 10 MMOL/L (ref 8–16)
BASOPHILS # BLD AUTO: 0.02 K/UL (ref 0–0.2)
BASOPHILS NFR BLD: 0.4 % (ref 0–1.9)
BUN SERPL-MCNC: 17 MG/DL (ref 8–23)
CALCIUM SERPL-MCNC: 9.2 MG/DL (ref 8.7–10.5)
CHLORIDE SERPL-SCNC: 96 MMOL/L (ref 95–110)
CO2 SERPL-SCNC: 31 MMOL/L (ref 23–29)
CREAT SERPL-MCNC: 1 MG/DL (ref 0.5–1.4)
DIFFERENTIAL METHOD: ABNORMAL
EOSINOPHIL # BLD AUTO: 0.1 K/UL (ref 0–0.5)
EOSINOPHIL NFR BLD: 2.5 % (ref 0–8)
ERYTHROCYTE [DISTWIDTH] IN BLOOD BY AUTOMATED COUNT: 14.4 % (ref 11.5–14.5)
EST. GFR  (AFRICAN AMERICAN): >60 ML/MIN/1.73 M^2
EST. GFR  (NON AFRICAN AMERICAN): 58 ML/MIN/1.73 M^2
GLUCOSE SERPL-MCNC: 253 MG/DL (ref 70–110)
HCT VFR BLD AUTO: 26.3 % (ref 37–48.5)
HGB BLD-MCNC: 8.3 G/DL (ref 12–16)
LYMPHOCYTES # BLD AUTO: 1.3 K/UL (ref 1–4.8)
LYMPHOCYTES NFR BLD: 22.7 % (ref 18–48)
MAGNESIUM SERPL-MCNC: 1.6 MG/DL (ref 1.6–2.6)
MCH RBC QN AUTO: 29.3 PG (ref 27–31)
MCHC RBC AUTO-ENTMCNC: 31.6 G/DL (ref 32–36)
MCV RBC AUTO: 93 FL (ref 82–98)
MONOCYTES # BLD AUTO: 0.8 K/UL (ref 0.3–1)
MONOCYTES NFR BLD: 13.5 % (ref 4–15)
NEUTROPHILS # BLD AUTO: 3.4 K/UL (ref 1.8–7.7)
NEUTROPHILS NFR BLD: 65.6 % (ref 38–73)
PHOSPHATE SERPL-MCNC: 2.2 MG/DL (ref 2.7–4.5)
PLATELET # BLD AUTO: 85 K/UL (ref 150–350)
PMV BLD AUTO: 11.1 FL (ref 9.2–12.9)
POCT GLUCOSE: 290 MG/DL (ref 70–110)
POCT GLUCOSE: 325 MG/DL (ref 70–110)
POTASSIUM SERPL-SCNC: 3.4 MMOL/L (ref 3.5–5.1)
RBC # BLD AUTO: 2.83 M/UL (ref 4–5.4)
SODIUM SERPL-SCNC: 137 MMOL/L (ref 136–145)
WBC # BLD AUTO: 5.56 K/UL (ref 3.9–12.7)

## 2019-08-01 PROCEDURE — 25000003 PHARM REV CODE 250: Performed by: INTERNAL MEDICINE

## 2019-08-01 PROCEDURE — 97535 SELF CARE MNGMENT TRAINING: CPT

## 2019-08-01 PROCEDURE — 97165 OT EVAL LOW COMPLEX 30 MIN: CPT

## 2019-08-01 PROCEDURE — 80048 BASIC METABOLIC PNL TOTAL CA: CPT

## 2019-08-01 PROCEDURE — S0028 INJECTION, FAMOTIDINE, 20 MG: HCPCS | Performed by: INTERNAL MEDICINE

## 2019-08-01 PROCEDURE — 84100 ASSAY OF PHOSPHORUS: CPT

## 2019-08-01 PROCEDURE — 97161 PT EVAL LOW COMPLEX 20 MIN: CPT

## 2019-08-01 PROCEDURE — 99233 SBSQ HOSP IP/OBS HIGH 50: CPT | Mod: ,,, | Performed by: INTERNAL MEDICINE

## 2019-08-01 PROCEDURE — 99232 PR SUBSEQUENT HOSPITAL CARE,LEVL II: ICD-10-PCS | Mod: ,,, | Performed by: UROLOGY

## 2019-08-01 PROCEDURE — 99233 PR SUBSEQUENT HOSPITAL CARE,LEVL III: ICD-10-PCS | Mod: ,,, | Performed by: INTERNAL MEDICINE

## 2019-08-01 PROCEDURE — 63600175 PHARM REV CODE 636 W HCPCS: Performed by: INTERNAL MEDICINE

## 2019-08-01 PROCEDURE — 99232 SBSQ HOSP IP/OBS MODERATE 35: CPT | Mod: ,,, | Performed by: UROLOGY

## 2019-08-01 PROCEDURE — 83735 ASSAY OF MAGNESIUM: CPT

## 2019-08-01 PROCEDURE — 85025 COMPLETE CBC W/AUTO DIFF WBC: CPT

## 2019-08-01 RX ORDER — CLOPIDOGREL BISULFATE 75 MG/1
75 TABLET ORAL DAILY
Qty: 30 TABLET | Refills: 5 | Status: SHIPPED | OUTPATIENT
Start: 2019-08-02 | End: 2020-08-01

## 2019-08-01 RX ORDER — NAPROXEN SODIUM 220 MG/1
81 TABLET, FILM COATED ORAL DAILY
Refills: 0 | COMMUNITY
Start: 2019-08-02 | End: 2020-08-01

## 2019-08-01 RX ORDER — SODIUM,POTASSIUM PHOSPHATES 280-250MG
1 POWDER IN PACKET (EA) ORAL ONCE
Status: COMPLETED | OUTPATIENT
Start: 2019-08-01 | End: 2019-08-01

## 2019-08-01 RX ORDER — DULOXETIN HYDROCHLORIDE 60 MG/1
60 CAPSULE, DELAYED RELEASE ORAL NIGHTLY
Qty: 30 CAPSULE | Refills: 5 | Status: SHIPPED | OUTPATIENT
Start: 2019-08-01 | End: 2020-07-31

## 2019-08-01 RX ORDER — CIPROFLOXACIN 500 MG/1
500 TABLET ORAL 2 TIMES DAILY
Qty: 24 TABLET | Refills: 0 | Status: SHIPPED | OUTPATIENT
Start: 2019-08-01 | End: 2019-08-13

## 2019-08-01 RX ORDER — POTASSIUM CHLORIDE 20 MEQ/1
60 TABLET, EXTENDED RELEASE ORAL ONCE
Status: COMPLETED | OUTPATIENT
Start: 2019-08-01 | End: 2019-08-01

## 2019-08-01 RX ADMIN — CLOPIDOGREL BISULFATE 75 MG: 75 TABLET ORAL at 10:08

## 2019-08-01 RX ADMIN — ASPIRIN 81 MG 81 MG: 81 TABLET ORAL at 10:08

## 2019-08-01 RX ADMIN — POTASSIUM & SODIUM PHOSPHATES POWDER PACK 280-160-250 MG 1 PACKET: 280-160-250 PACK at 12:08

## 2019-08-01 RX ADMIN — PIPERACILLIN AND TAZOBACTAM 4.5 G: 4; .5 INJECTION, POWDER, LYOPHILIZED, FOR SOLUTION INTRAVENOUS; PARENTERAL at 06:08

## 2019-08-01 RX ADMIN — FAMOTIDINE 20 MG: 10 INJECTION INTRAVENOUS at 10:08

## 2019-08-01 RX ADMIN — INSULIN ASPART 8 UNITS: 100 INJECTION, SOLUTION INTRAVENOUS; SUBCUTANEOUS at 12:08

## 2019-08-01 RX ADMIN — TRAMADOL HYDROCHLORIDE 50 MG: 50 TABLET, FILM COATED ORAL at 10:08

## 2019-08-01 RX ADMIN — ACETAMINOPHEN 650 MG: 325 TABLET, FILM COATED ORAL at 01:08

## 2019-08-01 RX ADMIN — CETIRIZINE HYDROCHLORIDE 5 MG: 5 TABLET ORAL at 10:08

## 2019-08-01 RX ADMIN — INSULIN ASPART 6 UNITS: 100 INJECTION, SOLUTION INTRAVENOUS; SUBCUTANEOUS at 10:08

## 2019-08-01 RX ADMIN — POTASSIUM CHLORIDE 60 MEQ: 1500 TABLET, EXTENDED RELEASE ORAL at 12:08

## 2019-08-01 NOTE — PLAN OF CARE
Problem: Occupational Therapy Goal  Goal: Occupational Therapy Goal  Goals to be met by: 08/15/19     Patient will increase functional independence with ADLs by performing:    LE Dressing with Set-up Assistance.  Grooming while standing at sink with Supervision.  Toileting from toilet with Stand-by Assistance for hygiene and clothing management.   Supine to sit with Supervision.  Step transfer with Supervision  Toilet transfer to toilet with Supervision.  Upper extremity exercise program x15 reps per handout, with independence.    Outcome: Ongoing (interventions implemented as appropriate)  Pt will continue to benefit from acute OT in order to increase independence with ADLs, functional transfers, and functional activity tolerance. OT rec. HHOT with family assistance at d/c.

## 2019-08-01 NOTE — ASSESSMENT & PLAN NOTE
Non-STEMI with chest pains.  Underwent PCI yesterday.  Since then has been chest pain-free.  Coronary angiogram revealing 90% stenosis of the left circumflex and drug-eluting stent was implanted.  No complications from coronary angiogram.  Continue aspirin 81 mg daily indefinitely, Plavix 75 mg daily uninterrupted for at least 1 year.  Importance of dual antiplatelet therapy was discussed with the patient in detail.  Continue aggressive risk factor modification.  start beta blockers, statins when able.  F/u in cardiology clinic

## 2019-08-01 NOTE — PROGRESS NOTES
[facility] CASE MANAGEMENT                  WRITTEN DISCHARGE INFORMATION      APPOINTMENTS AND RESOURCES TO HELP YOU MANAGE YOUR CARE AT HOME BASED ON YOUR PREFERENCES:  (If an appointment is not scheduled for you when you leave the hospital, call your doctor to schedule a follow up visit within a week)    Follow-up Information     Cardiology. Schedule an appointment as soon as possible for a visit in 1 week.    Why:  for your heart health           Urology. Schedule an appointment as soon as possible for a visit in 1 week.    Why:  for Urology follow up           Natasha at home On 8/2/2019.    Why:  Home Health  Contact information:  [phone]                 Healthy Living Instructions to HELP MANAGE YOUR CARE AT HOME:  Things You are responsible for:  1.    Getting your prescriptions filled   2.    Taking your medications as directed, DO NOT MISS ANY DOSES!  3.    Following the diet and exercise recommended by your doctor  4.    Going to your follow-up doctor appointment. This is important because it allows the doctor to monitor your progress and determine if any changes need to made to your treatment plan.  5. If you have any questions about MANAGING YOUR CARE AT HOME Call the Nurse Care Line for 24/7 Assistance [phone]       Please answer any calls you may receive from [facility]. We want to continue to support you as you manage your healthcare needs. [facility] is happy to have the opportunity to serve you.      Thank you for choosing [facility] for your healthcare needs!  Your [facility] Case Management Team,

## 2019-08-01 NOTE — SUBJECTIVE & OBJECTIVE
Interval History: No new issues. Would like to go home .      Review of Systems   Constitutional: Negative for chills.   Respiratory: Negative for apnea and shortness of breath.    Cardiovascular: Negative for chest pain.   Gastrointestinal: Negative for abdominal distention.   Genitourinary: Negative for difficulty urinating.   Musculoskeletal: Negative for arthralgias.   Skin: Negative for color change.     Objective:     Vital Signs (Most Recent):  Temp: 98.3 °F (36.8 °C) (08/01/19 0738)  Pulse: 99 (08/01/19 0738)  Resp: 19 (08/01/19 0738)  BP: (!) 148/70 (08/01/19 0738)  SpO2: 96 % (08/01/19 0738) Vital Signs (24h Range):  Temp:  [98.1 °F (36.7 °C)-98.6 °F (37 °C)] 98.3 °F (36.8 °C)  Pulse:  [89-99] 99  Resp:  [18-20] 19  SpO2:  [93 %-100 %] 96 %  BP: (120-155)/(58-76) 148/70     Weight: 81.9 kg (180 lb 8.9 oz)  Body mass index is 34.12 kg/m².    Intake/Output Summary (Last 24 hours) at 8/1/2019 1028  Last data filed at 8/1/2019 0200  Gross per 24 hour   Intake --   Output 5000 ml   Net -5000 ml      Physical Exam   Constitutional: She is oriented to person, place, and time. She appears well-developed and well-nourished.   HENT:   Head: Normocephalic and atraumatic.   Cardiovascular: Normal rate and regular rhythm.   Neurological: She is alert and oriented to person, place, and time.   Psychiatric: She has a normal mood and affect. Her behavior is normal.       Significant Labs:   BMP:   Recent Labs   Lab 08/01/19  0441   *      K 3.4*   CL 96   CO2 31*   BUN 17   CREATININE 1.0   CALCIUM 9.2   MG 1.6     CBC:   Recent Labs   Lab 07/31/19  0434 08/01/19 0441   WBC 8.47 5.56   HGB 7.8* 8.3*   HCT 25.2* 26.3*   PLT 70* 85*       Significant Imaging:

## 2019-08-01 NOTE — NURSING
Reported off to oncoming nurse, patient resting in bed, aaox4. Patient can make needs known to staff, minimal assist required for adls and transfers, no acute distress noted, safety precautions maintained. Right groin site intact, dressing clean and dry. No redness, No hematoma noted.     Chart check completed.

## 2019-08-01 NOTE — NURSING
16 Setswana khoury catheter removed from pt without difficulty. 10ml of fluid removed from balloon. 1300ml of light pink urine emptied from khoury bag.

## 2019-08-01 NOTE — PROGRESS NOTES
[facility]  Urology  Progress Note    Patient Name: Dasia Abreu  MRN: [id number]  Admission Date: 7/27/2019  Hospital Length of Stay: 5 days  Code Status: Full Code   Attending Provider: Miguelito Enriquez MD   Primary Care Physician: Primary Doctor No    Subjective:     HPI:  Urolithiasis  Patient complains of right abdominal pain with radiation to the abdomen. Onset of symptoms was abrupt starting 2 days ago with unchanged course since that time. Patient describes the pain as aching, intermittent and rated as moderate. The patient has had nausea and vomiting and diaphoresis. There has been fever and chills. The patient is not complaining of dysuria or frequency. Risk factors for urolithiasis: none.        Interval History: feeling better.  Complains of headache    Review of Systems   Constitutional: Negative.    HENT: Negative.    Eyes: Negative.    Respiratory: Negative for cough, chest tightness and shortness of breath.    Cardiovascular: Negative for chest pain.   Gastrointestinal: Negative.  Negative for constipation, diarrhea and nausea.   Musculoskeletal: Negative.    Neurological: Negative.    Psychiatric/Behavioral: Negative.      Objective:     Temp:  [98.1 °F (36.7 °C)-98.6 °F (37 °C)] 98.3 °F (36.8 °C)  Pulse:  [89-99] 99  Resp:  [18-20] 19  SpO2:  [93 %-100 %] 96 %  BP: (120-155)/(58-76) 148/70     Body mass index is 34.12 kg/m².           Drains     Drain                 Urethral Catheter 07/28/19 1045 Straight-tip 16 Fr. 3 days                Physical Exam   Nursing note and vitals reviewed.  Constitutional: She is oriented to person, place, and time. She appears well-developed.   HENT:   Head: Normocephalic.   Eyes: Conjunctivae are normal.   Neck: Normal range of motion. No tracheal deviation present. No thyromegaly present.   Cardiovascular: Normal rate, normal heart sounds and normal pulses.    Pulmonary/Chest: Effort normal and breath sounds normal. No respiratory distress.  She has no wheezes.   Abdominal: Soft. She exhibits no distension and no mass. There is no hepatosplenomegaly. There is no tenderness. There is no rebound, no guarding and no CVA tenderness. No hernia.   Genitourinary:   Genitourinary Comments: Sosa with pink tinged urine   Musculoskeletal: Normal range of motion. She exhibits no edema or tenderness.   Lymphadenopathy:     She has no cervical adenopathy.   Neurological: She is alert and oriented to person, place, and time.   Skin: Skin is warm and dry. No rash noted. No erythema.     Psychiatric: She has a normal mood and affect. Her behavior is normal. Judgment and thought content normal.       Significant Labs:    BMP:  Recent Labs   Lab 07/30/19  0203 07/31/19  0434 08/01/19  0441    140 137   K 3.2* 3.7 3.4*    101 96   CO2 29 27 31*   BUN 24* 17 17   CREATININE 1.4 1.2 1.0   CALCIUM 8.0* 8.6* 9.2       CBC:   Recent Labs   Lab 07/30/19  0203 07/31/19  0434 08/01/19  0441   WBC 8.59 8.47 5.56   HGB 8.3* 7.8* 8.3*   HCT 25.4* 25.2* 26.3*   PLT 81* 70* 85*       Blood Culture:   Recent Labs   Lab 07/27/19  1935 07/29/19  1013 07/29/19  1042   LABBLOO Gram stain aer bottle: Gram negative rods  Gram stain nilsa bottle: Gram negative rods  Results called to and read back by: ICU BRENNEN ROGER 07/28/2019  07:40  PROTEUS MIRABILIS  For susceptibility see order #[id number]  * No Growth to date  No Growth to date  No Growth to date No Growth to date  No Growth to date  No Growth to date     Urine Culture:   Recent Labs   Lab 07/28/19  0110   LABURIN PROTEUS MIRABILIS  10,000 - 49,999 cfu/ml  *       Significant Imaging:                    Assessment/Plan:     * Severe sepsis with septic shock  2/2 UTI/obstructing ureteral stone  Treat with culture specific antibiotics x 2 weeks    Ureteral calculi  With UTI/sepsis  S/p stent placement  Will need treatment of stone in future when acute illness resolved    Obstructive pyelonephritis  2/2 R ureteral  stone  S/p R ureteral stent placement by Dr Mendez 7/28/19    D/C Sheila Bashir to d/c home from a  standpoint    Acute renal failure  Stent placed  Improving        VTE Risk Mitigation (From admission, onward)        Ordered     Place sequential compression device  Until discontinued      07/29/19 1254     Place KANDICE hose  Until discontinued      07/29/19 1254     IP VTE LOW RISK PATIENT  Once      07/28/19 0018          W Dylan Mendez MD  Urology  [facility]

## 2019-08-01 NOTE — PROGRESS NOTES
[facility]  Cardiology  Progress Note    Patient Name: Dasia Abreu  MRN: [id number]  Admission Date: 7/27/2019  Hospital Length of Stay: 5 days  Code Status: Full Code   Attending Physician: Miguelito Enriquez MD   Primary Care Physician: Primary Doctor No  Expected Discharge Date: 8/1/2019  Principal Problem:Severe sepsis with septic shock    Subjective:       Interval History: s/p pci of lcx yesterday.  No further episodes of chest pain.  Denies orthopnea, PND, swelling of feet.  No complications from cardiac catheterization.  No bleeding     Review of Systems   Constitution: Positive for malaise/fatigue.   HENT: Negative.    Eyes: Negative.    Cardiovascular: Negative.  Negative for chest pain.   Respiratory: Negative.    Endocrine: Negative.    Hematologic/Lymphatic: Negative.    Skin: Negative.    Musculoskeletal: Negative.    Gastrointestinal: Negative.    Genitourinary: Negative.    Neurological: Negative.    Psychiatric/Behavioral: Negative.    Allergic/Immunologic: Negative.      Objective:     Vital Signs (Most Recent):  Temp: 98.3 °F (36.8 °C) (08/01/19 0738)  Pulse: 99 (08/01/19 0738)  Resp: 19 (08/01/19 0738)  BP: (!) 148/70 (08/01/19 0738)  SpO2: 96 % (08/01/19 0738) Vital Signs (24h Range):  Temp:  [98.1 °F (36.7 °C)-98.6 °F (37 °C)] 98.3 °F (36.8 °C)  Pulse:  [89-99] 99  Resp:  [18-20] 19  SpO2:  [93 %-100 %] 96 %  BP: (120-155)/(58-76) 148/70     Weight: 81.9 kg (180 lb 8.9 oz)  Body mass index is 34.12 kg/m².     SpO2: 96 %  O2 Device (Oxygen Therapy): nasal cannula      Intake/Output Summary (Last 24 hours) at 8/1/2019 1035  Last data filed at 8/1/2019 0200  Gross per 24 hour   Intake --   Output 5000 ml   Net -5000 ml       Lines/Drains/Airways     Peripheral Intravenous Line                 Midline Catheter Insertion/Assessment  - Single Lumen 07/30/19 1615 Right basilic vein (medial side of arm)  1 day                Physical Exam   Constitutional: She is oriented to person,  place, and time. She appears well-developed and well-nourished.   HENT:   Head: Normocephalic.   Eyes: Pupils are equal, round, and reactive to light.   Neck: Normal range of motion. Neck supple.   Cardiovascular: Normal rate and regular rhythm.   Pulmonary/Chest: Effort normal and breath sounds normal.   Abdominal: Soft. Normal appearance and bowel sounds are normal. There is no tenderness.   Musculoskeletal: Normal range of motion.   Neurological: She is alert and oriented to person, place, and time.   Skin: Skin is warm.   Psychiatric: She has a normal mood and affect.       Significant Labs:   BMP:   Recent Labs   Lab 07/31/19 0434 08/01/19 0441   * 253*    137   K 3.7 3.4*    96   CO2 27 31*   BUN 17 17   CREATININE 1.2 1.0   CALCIUM 8.6* 9.2   MG 1.7 1.6   , CMP   Recent Labs   Lab 07/31/19 0434 08/01/19 0441    137   K 3.7 3.4*    96   CO2 27 31*   * 253*   BUN 17 17   CREATININE 1.2 1.0   CALCIUM 8.6* 9.2   ANIONGAP 12 10   ESTGFRAFRICA 54* >60   EGFRNONAA 47* 58*   , CBC   Recent Labs   Lab 07/31/19 0434 08/01/19 0441   WBC 8.47 5.56   HGB 7.8* 8.3*   HCT 25.2* 26.3*   PLT 70* 85*   , Lipid Panel No results for input(s): CHOL, HDL, LDLCALC, TRIG, CHOLHDL in the last 48 hours., Troponin No results for input(s): TROPONINI in the last 48 hours. and All pertinent lab results from the last 24 hours have been reviewed.    Significant Imaging: personally reviewed    Assessment and Plan:         NSTEMI (non-ST elevated myocardial infarction)  Non-STEMI with chest pains.  Underwent PCI yesterday.  Since then has been chest pain-free.  Coronary angiogram revealing 90% stenosis of the left circumflex and drug-eluting stent was implanted.  No complications from coronary angiogram.  Continue aspirin 81 mg daily indefinitely, Plavix 75 mg daily uninterrupted for at least 1 year.  Importance of dual antiplatelet therapy was discussed with the patient in detail.  Continue  aggressive risk factor modification.  start beta blockers, statins when able.  F/u in cardiology clinic    Coronary artery disease  As per chart, history of nonobstructive coronary artery disease.  Try to get records from patient's cardiologist    Type 2 diabetes mellitus  Management per primary    Essential hypertension  Antihypertensives on hold.  Blood pressure controlled        VTE Risk Mitigation (From admission, onward)        Ordered     Place sequential compression device  Until discontinued      07/29/19 1254     Place KANDICE hose  Until discontinued      07/29/19 1254     IP VTE LOW RISK PATIENT  Once      07/28/19 0018          Stephani Lofton MD  Cardiology  [facility]

## 2019-08-01 NOTE — NURSING
Bedside shift report received from KANG Dukes. Communication board has been updated. NAD noted. Will continue to monitor. Pt is awake and lying in bed.  is at the bedside. O2 at 2 liters.

## 2019-08-01 NOTE — ASSESSMENT & PLAN NOTE
Secondary to obstructive pyelonephritis with ureteral stone and Proteus bacteremia.  Proteus Sepsis.  S/P cysto with stent placement.  Initially started on Vanc and Zosyn.  Vanc stopped.  Weaned off Levophed.    Repeated BCx to document clearance.  Zosyn for now 2 weeks from 7/29  Cipro for 12 days.

## 2019-08-01 NOTE — PT/OT/SLP EVAL
"Occupational Therapy   Evaluation and Treatment    Name: Dasia Abreu  MRN: [id number]  Admitting Diagnosis:  Severe sepsis with septic shock 1 Day Post-Op    Recommendations:     Discharge Recommendations: home health OT(with family supervision)  Discharge Equipment Recommendations:  (TBD)  Barriers to discharge:  None    Assessment:     Dasia Abreu is a 68 y.o. female with a medical diagnosis of Severe sepsis with septic shock.  She presents with decreased functional activity tolerance impacting independence with ADLs and functional transfers/mobility. Performance deficits affecting function: weakness, impaired endurance, impaired self care skills, impaired functional mobilty, impaired cardiopulmonary response to activity, impaired balance, decreased lower extremity function, decreased safety awareness.      Rehab Prognosis: Good; patient would benefit from acute skilled OT services to address these deficits and reach maximum level of function.       Plan:     Patient to be seen 3 x/week to address the above listed problems via self-care/home management, therapeutic activities, therapeutic exercises  · Plan of Care Expires: 08/15/19  · Plan of Care Reviewed with: patient, spouse    Subjective     Chief Complaint: lower back pain after standing to complete grooming at the sink, R leg feeling "heavy" while ambulating   Patient/Family Comments/goals: ready to get the khoury cath out     Occupational Profile:  Living Environment: Pt lives with her spouse and family member in a [facility] with 3 CINDY and B/L HR (1 CINDY from back of home).   Previous level of function: Pt was mod I with ADLs (using shower chair) and independent with functional mobility. 0 falls within the last 6 months  Roles and Routines: cooking, cleaning, driving, retired    Equipment Used at Home:  shower chair  Assistance upon Discharge: family     Pain/Comfort:  Pain Rating 1: 0/10    Patients cultural, spiritual, Jainism conflicts " given the current situation: no    Objective:     Communicated with: nurseNayely, prior to session.  Patient found HOB elevated with khoury catheter, peripheral IV, oxygen, telemetry, PICC line upon OT entry to room.    General Precautions: Standard, fall   Orthopedic Precautions:N/A   Braces: N/A     Occupational Performance:    Bed Mobility:    · Patient completed Rolling/Turning to Right with stand by assistance and with side rail  · Patient completed Scooting/Bridging with stand by assistance  · Patient completed Supine to Sit with minimum assistance, with side rail and HOB elevated    Functional Mobility/Transfers:  · Patient completed Sit <> Stand Transfer with contact guard assistance  with  rolling walker   · Patient completed Bed <> Chair Transfer using Step Transfer technique with contact guard assistance with rolling walker  · Functional Mobility: Pt ambulated from bed>sink>chair with CGA and RW. Pt stood to complete grooming at the sink for ~9 min.     Activities of Daily Living:  · Grooming: contact guard assistance standing at the sink to brush teeth and wash face  · Upper Body Dressing: set-up with hospital gown     · Lower Body Dressing: contact guard assistance with socks at EOB    Cognitive/Visual Perceptual:  Cognitive/Psychosocial Skills:     -       Oriented to: Person, Place, Time and Situation   -       Follows Commands/attention:Follows multistep  commands  -       Communication: clear/fluent  -       Memory: No Deficits noted  -       Safety awareness/insight to disability: impaired   -       Mood/Affect/Coping skills/emotional control: Appropriate to situation  Visual/Perceptual:      -Intact  acuity      Physical Exam:  Balance:    -       sitting: SUP; standing: CGA with RW  Postural examination/scapula alignment:    -       Rounded shoulders  -       Forward head  Skin integrity: red, irritated skin on cheek; nurseNayely, notified pt requesting cream   Edema:  Mild BUE  Sensation:     -       Intact  light/touch BUE  Dominant hand:    -       Right  Upper Extremity Range of Motion:     -       Right Upper Extremity: WFL  -       Left Upper Extremity: WFL  Upper Extremity Strength:    -       Right Upper Extremity: WFL  -       Left Upper Extremity: WFL   Strength:    -       Right Upper Extremity: WFL  -       Left Upper Extremity: WFL  Fine Motor Coordination:    -       Intact  Left hand, manipulation of objects and Right hand, manipulation of objects  Gross motor coordination:   WFL    AMPAC 6 Click ADL:  AMPAC Total Score: 21    Treatment & Education:  Pt educated on OT role/POC.   Importance of OOB activity with staff assistance.  Safety during functional t/f and mobility   Pursed lip breathing with exertion   Importance of sitting up in the chair during the day/complete ankle pumps throughout the day   White board updated   Multiple self-care tasks/functional mobility completed- assistance level noted above   Pt required increased time with ambulation within the room; pt tolerated standing at the sink for 9 min with CGA  All questions/concerns answered within OT scope of practice     Education:    Patient left up in chair with all lines intact, call button in reach, nurseNayely, notified and spouse present    GOALS:   Multidisciplinary Problems     Occupational Therapy Goals        Problem: Occupational Therapy Goal    Goal Priority Disciplines Outcome Interventions   Occupational Therapy Goal     OT, PT/OT Ongoing (interventions implemented as appropriate)    Description:  Goals to be met by: 08/15/19     Patient will increase functional independence with ADLs by performing:    LE Dressing with Set-up Assistance.  Grooming while standing at sink with Supervision.  Toileting from toilet with Stand-by Assistance for hygiene and clothing management.   Supine to sit with Supervision.  Step transfer with Supervision  Toilet transfer to toilet with Supervision.  Upper extremity exercise  program x15 reps per handout, with independence.                      History:     Past Medical History:   Diagnosis Date    Diabetes mellitus     Diverticulitis     Hypertension     Thyroid disease          Past Surgical History:   Procedure Laterality Date    BACK SURGERY      BLADDER SUSPENSION      CATARACT EXTRACTION, BILATERAL      CYSTOSCOPY, WITH URETERAL STENT INSERTION Bilateral 7/28/2019    Performed [facility] Mendez [facility] OR    HYSTERECTOMY         Time Tracking:     OT Date of Treatment: 08/01/19  OT Start Time: 0820  OT Stop Time: 0850  OT Total Time (min): 30 min    Billable Minutes:Evaluation 15 min  Self Care/Home Management 15 min  Total Time 30 min    Nini Padilla OT  8/1/2019

## 2019-08-01 NOTE — SUBJECTIVE & OBJECTIVE
Interval History: s/p pci of lcx yesterday.  No further episodes of chest pain.  Denies orthopnea, PND, swelling of feet.  No complications from cardiac catheterization.  No bleeding     Review of Systems   Constitution: Positive for malaise/fatigue.   HENT: Negative.    Eyes: Negative.    Cardiovascular: Negative.  Negative for chest pain.   Respiratory: Negative.    Endocrine: Negative.    Hematologic/Lymphatic: Negative.    Skin: Negative.    Musculoskeletal: Negative.    Gastrointestinal: Negative.    Genitourinary: Negative.    Neurological: Negative.    Psychiatric/Behavioral: Negative.    Allergic/Immunologic: Negative.      Objective:     Vital Signs (Most Recent):  Temp: 98.3 °F (36.8 °C) (08/01/19 0738)  Pulse: 99 (08/01/19 0738)  Resp: 19 (08/01/19 0738)  BP: (!) 148/70 (08/01/19 0738)  SpO2: 96 % (08/01/19 0738) Vital Signs (24h Range):  Temp:  [98.1 °F (36.7 °C)-98.6 °F (37 °C)] 98.3 °F (36.8 °C)  Pulse:  [89-99] 99  Resp:  [18-20] 19  SpO2:  [93 %-100 %] 96 %  BP: (120-155)/(58-76) 148/70     Weight: 81.9 kg (180 lb 8.9 oz)  Body mass index is 34.12 kg/m².     SpO2: 96 %  O2 Device (Oxygen Therapy): nasal cannula      Intake/Output Summary (Last 24 hours) at 8/1/2019 1035  Last data filed at 8/1/2019 0200  Gross per 24 hour   Intake --   Output 5000 ml   Net -5000 ml       Lines/Drains/Airways     Peripheral Intravenous Line                 Midline Catheter Insertion/Assessment  - Single Lumen 07/30/19 1615 Right basilic vein (medial side of arm)  1 day                Physical Exam   Constitutional: She is oriented to person, place, and time. She appears well-developed and well-nourished.   HENT:   Head: Normocephalic.   Eyes: Pupils are equal, round, and reactive to light.   Neck: Normal range of motion. Neck supple.   Cardiovascular: Normal rate and regular rhythm.   Pulmonary/Chest: Effort normal and breath sounds normal.   Abdominal: Soft. Normal appearance and bowel sounds are normal. There is no  tenderness.   Musculoskeletal: Normal range of motion.   Neurological: She is alert and oriented to person, place, and time.   Skin: Skin is warm.   Psychiatric: She has a normal mood and affect.       Significant Labs:   BMP:   Recent Labs   Lab 07/31/19  0434 08/01/19  0441   * 253*    137   K 3.7 3.4*    96   CO2 27 31*   BUN 17 17   CREATININE 1.2 1.0   CALCIUM 8.6* 9.2   MG 1.7 1.6   , CMP   Recent Labs   Lab 07/31/19  0434 08/01/19  0441    137   K 3.7 3.4*    96   CO2 27 31*   * 253*   BUN 17 17   CREATININE 1.2 1.0   CALCIUM 8.6* 9.2   ANIONGAP 12 10   ESTGFRAFRICA 54* >60   EGFRNONAA 47* 58*   , CBC   Recent Labs   Lab 07/31/19 0434 08/01/19 0441   WBC 8.47 5.56   HGB 7.8* 8.3*   HCT 25.2* 26.3*   PLT 70* 85*   , Lipid Panel No results for input(s): CHOL, HDL, LDLCALC, TRIG, CHOLHDL in the last 48 hours., Troponin No results for input(s): TROPONINI in the last 48 hours. and All pertinent lab results from the last 24 hours have been reviewed.    Significant Imaging: personally reviewed

## 2019-08-01 NOTE — PROGRESS NOTES
[facility] Hospital Medicine  Progress Note    Patient Name: Dasia Abreu  MRN: [id number]  Patient Class: IP- Inpatient   Admission Date: 7/27/2019  Length of Stay: 5 days  Attending Physician: Miguelito Enriquez MD  Primary Care Provider: Primary Doctor No        Subjective:     Principal Problem:Severe sepsis with septic shock      HPI:  Ms. Dasia Abreu is a 68 y.o. female from [place] with essential hypertension, type 2 diabetes mellitus (HbA1c unknown), CAD, and anemia chronic disease who presents to [facility] ED with complaints of diarrhea starting five days ago.  Her elderly mother lives with her in [place] and was driven into town five days ago to attend an appointment with her doctors.  Starting that they she started to watery stools that had resolved by the 2nd day.  Her friend recently bought a new car and she and her group of friends took the car to the [facility].  She started to feel very tired and lethargic and had chills once she arrived home.  She also complained of lower abdominal pain that started that day that was associated with urinary frequency; she denies any dysuria, hematuria, nor any urinary urgency.  Abdominal pain was nonradiating and was 8/10 in severity at its worst.  She also denies any chest pain, shortness of breath, palpitations, nor any diaphoresis.  She also reports right shoulder/right lower neck pain starting around the same time and started have a headache starting yesterday.  She denies any blurry vision.  She also denies any rashes nor joint pain. She has otherwise been in her usual state of health.    Overview/Hospital Course:  69 y/o female presented with septic shock secondary to obstructive pyelonephritis with ureteral stone and Proteus bacteremia.  Admitted to ICU on Levophed.  Urology consulted.  Patient underwent cysto with stent placement on 7/28.  Patient also noted to have elevated troponin, consistent with NSTEMI.  Cardiology consulted.   Also presented with ARF.  Patient unable to get extubated post procedure and Pulmonary consulted.  Initially started on Vanc and Zosyn.  Vanc stopped.  She was started on DAPT on 7/30/2019.  [facility] 7/31/2019. Culprit vessel was proximal to mid OM1 with severe 90% stenosis.  Successful PCI of OM1 with drug-eluting stent x1.  PT/OT consulted on 7/31 for an eval. Renal function improved.  PT/OT rec: home with H/H.  Urology pulled khoury and signed off. Patient will need follow up with cards and urology in [place] in the next 1-2 weeks. Patient will go  Home on Cipro for 12 days. Activity as tolerated. Diet- low NA ADA 1800 karol diet.  Follow up with PCP in one week. H/H will be arranged.       Interval History: No new issues. Would like to go home .      Review of Systems   Constitutional: Negative for chills.   Respiratory: Negative for apnea and shortness of breath.    Cardiovascular: Negative for chest pain.   Gastrointestinal: Negative for abdominal distention.   Genitourinary: Negative for difficulty urinating.   Musculoskeletal: Negative for arthralgias.   Skin: Negative for color change.     Objective:     Vital Signs (Most Recent):  Temp: 98.3 °F (36.8 °C) (08/01/19 0738)  Pulse: 99 (08/01/19 0738)  Resp: 19 (08/01/19 0738)  BP: (!) 148/70 (08/01/19 0738)  SpO2: 96 % (08/01/19 0738) Vital Signs (24h Range):  Temp:  [98.1 °F (36.7 °C)-98.6 °F (37 °C)] 98.3 °F (36.8 °C)  Pulse:  [89-99] 99  Resp:  [18-20] 19  SpO2:  [93 %-100 %] 96 %  BP: (120-155)/(58-76) 148/70     Weight: 81.9 kg (180 lb 8.9 oz)  Body mass index is 34.12 kg/m².    Intake/Output Summary (Last 24 hours) at 8/1/2019 1028  Last data filed at 8/1/2019 0200  Gross per 24 hour   Intake --   Output 5000 ml   Net -5000 ml      Physical Exam   Constitutional: She is oriented to person, place, and time. She appears well-developed and well-nourished.   HENT:   Head: Normocephalic and atraumatic.   Cardiovascular: Normal rate and regular rhythm.    Neurological: She is alert and oriented to person, place, and time.   Psychiatric: She has a normal mood and affect. Her behavior is normal.       Significant Labs:   BMP:   Recent Labs   Lab 08/01/19  0441   *      K 3.4*   CL 96   CO2 31*   BUN 17   CREATININE 1.0   CALCIUM 9.2   MG 1.6     CBC:   Recent Labs   Lab 07/31/19  0434 08/01/19  0441   WBC 8.47 5.56   HGB 7.8* 8.3*   HCT 25.2* 26.3*   PLT 70* 85*       Significant Imaging:       Assessment/Plan:      * Severe sepsis with septic shock  Secondary to obstructive pyelonephritis with ureteral stone and Proteus bacteremia.  Proteus Sepsis.  S/P cysto with stent placement.  Initially started on Vanc and Zosyn.  Vanc stopped.  Weaned off Levophed.    Repeated BCx to document clearance.  Zosyn for now 2 weeks from 7/29  Cipro for 12 days.    Hypokalemia  Replace as needed.      Thrombocytopenia, unspecified  Secondary to sepsis.  Monitor     Ureteral calculi  As above. Needs urology follow up. Patient understands.      Acute respiratory failure with hypoxia  Patient was unable to get extubated post procedure.  Unsure etiology.  Appreciate Pulmonary input.  Looks well and may be able to get extubated 7/29/2019    Elevated troponin  See CAD    Obstructive pyelonephritis  Appreciate  input.  S/P cysto with stent placement on 7/28.  Sosa with bloody urine.  Follow  recs.  Held Lovenox at first as she was also thrombocytopenic.  SCD's and KANDICE's for DVT prophylaxis.      Anemia of chronic disease  Unfortunately we do not have previous lab work to which to compare to chronicity of her anemia.  There is no indication for transfusion at this time.  Monitor.    Coronary artery disease  She was noted on cardiac CT-A to have nonobstructive coronary artery disease in September 2018 and has been following up with a cardiologist in [place].    Patient with elevated Troponin, consistent with NSTEMI.  This may be secondary to demand from septic shock.   Appreciate Cards input.    DAPT started on 7/30/2019.  [facility] 7/31/2019- Culprit vessel was proximal to mid OM1 with severe 90% stenosis.  Successful PCI of OM1 with drug-eluting stent x1    Type 2 diabetes mellitus  Review of her previous medical records reveal that she is on metformin and mixed insulin therapy; will provide basal-prandial insulin therapy along with insulin sliding scale.   HgA1c of 8.4%    Essential hypertension  Currently hypotensive.  Hold home BP medications.    Acute renal failure  We do not have previous lab work to which compared to chronicity of her renal failure but it is presumed to be acute in chronicity.    Probably secondary to sepsis and obstruction.  Improved with IVF's  resolved      VTE Risk Mitigation (From admission, onward)        Ordered     Place sequential compression device  Until discontinued      07/29/19 1254     Place KANDICE hose  Until discontinued      07/29/19 1254     IP VTE LOW RISK PATIENT  Once      07/28/19 0018          D/c to home.         Miguelito Douglas MD  Department of Hospital Medicine   [facility]

## 2019-08-01 NOTE — PLAN OF CARE
[facility]    HOME HEALTH ORDERS  FACE TO FACE ENCOUNTER    Patient Name: Dasia Abreu  YOB: 1950    PCP: Primary Doctor No   PCP Address: None  PCP Phone Number: None  PCP Fax: None    Encounter Date: 08/01/2019    Admit to Home Health    Diagnoses: sepsis/MI  Active Hospital Problems    Diagnosis  POA    NSTEMI (non-ST elevated myocardial infarction) [I21.4]  Unknown    Ureteral calculi [N20.1]  Yes    Essential hypertension [I10]  Yes     Chronic    Type 2 diabetes mellitus [E11.9]  Yes     Chronic    Coronary artery disease [I25.10]  Yes     Chronic    Anemia of chronic disease [D63.8]  Yes     Chronic      Resolved Hospital Problems    Diagnosis Date Resolved POA    *Severe sepsis with septic shock [A41.9, R65.21] 08/01/2019 Yes     Priority: 1 - High    Thrombocytopenia, unspecified [D69.6] 08/01/2019 No    Hypokalemia [E87.6] 08/01/2019 No    Acute renal failure [N17.9] 08/01/2019 Yes    Obstructive pyelonephritis [N11.1] 08/01/2019 Yes    Elevated troponin [R74.8] 08/01/2019 Yes    Acute respiratory failure with hypoxia [J96.01] 08/01/2019 No    Metabolic acidosis [E87.2] 07/29/2019 Yes       No future appointments.  Follow-up Information     W Dylan Mendez MD In 1 week.    Specialty:  Urology  Why:  For post-op follow up and to arrange stone treatment  Contact information:  [address]  [address]  [place] [address]  [phone]             Stephani Lofton MD.    Specialties:  Cardiology, INTERVENTIONAL CARDIOLOGY  Contact information:  [address]  [address]  [place] [id number]  [phone]                     I have seen and examined this patient face to face today. My clinical findings that support the need for the home health skilled services and home bound status are the following:  Weakness/numbness causing balance and gait disturbance due to Weakness/Debility making it taxing to leave home.    Allergies:Review of patient's allergies  indicates:  No Known Allergies    Diet: diabetic diet: 2000 calorie and 2 gram sodium diet    Activities: activity as tolerated    Nursing:   SN to complete comprehensive assessment including routine vital signs. Instruct on disease process and s/s of complications to report to MD. Review/verify medication list sent home with the patient at time of discharge  and instruct patient/caregiver as needed. Frequency may be adjusted depending on start of care date.    Notify MD if SBP > 160 or < 90; DBP > 90 or < 50; HR > 120 or < 50; Temp > 101; Other:         CONSULTS:    Physical Therapy to evaluate and treat. Evaluate for home safety and equipment needs; Establish/upgrade home exercise program. Perform / instruct on therapeutic exercises, gait training, transfer training, and Range of Motion.  Occupational Therapy to evaluate and treat. Evaluate home environment for safety and equipment needs. Perform/Instruct on transfers, ADL training, ROM, and therapeutic exercises.  Aide to provide assistance with personal care, ADLs, and vital signs.        Medications: Review discharge medications with patient and family and provide education.      Current Discharge Medication List      START taking these medications    Details   aspirin 81 MG Chew Take 1 tablet (81 mg total) by mouth once daily.  Refills: 0      ciprofloxacin HCl (CIPRO) 500 MG tablet Take 1 tablet (500 mg total) by mouth 2 (two) times daily. for 12 days  Qty: 24 tablet, Refills: 0      clopidogrel (PLAVIX) 75 mg tablet Take 1 tablet (75 mg total) by mouth once daily.  Qty: 30 tablet, Refills: 5      DULoxetine (CYMBALTA) 60 MG capsule Take 1 capsule (60 mg total) by mouth every evening.  Qty: 30 capsule, Refills: 5             I certify that this patient is confined to her home and needs intermittent skilled nursing care, physical therapy and occupational therapy.

## 2019-08-01 NOTE — NURSING
Discharge instructions given to patient in blue folder. Belongings given to patient. PICC line removed from RUE. Cardiac monitor removed. Cipro and plavix paper prescriptions written and given to pt. Patient folder at desk was cleaned and put in filing bin. Education on diabetes, tegaderm dressing over PICC line and education on blood thinners given. Paperwork on cardiac stents given to pt as well.

## 2019-08-01 NOTE — ASSESSMENT & PLAN NOTE
She was noted on cardiac CT-A to have nonobstructive coronary artery disease in September 2018 and has been following up with a cardiologist in [place].    Patient with elevated Troponin, consistent with NSTEMI.  This may be secondary to demand from septic shock.  Appreciate Cards input.    DAPT started on 7/30/2019.  [facility] 7/31/2019- Culprit vessel was proximal to mid OM1 with severe 90% stenosis.  Successful PCI of OM1 with drug-eluting stent x1

## 2019-08-01 NOTE — PLAN OF CARE
Problem: Physical Therapy Goal  Goal: Physical Therapy Goal  Goals to be met by: 2019     Patient will increase functional independence with mobility by performin. Sit to stand transfer with Supervision  2. Gait  x 100 feet with Supervision with or without AD     Outcome: Ongoing (interventions implemented as appropriate)  In itial PT evalaution performed.  Pt could benefit from skilled PT services 6x/wk in order to maximize function prior to D/C.  HHPT and RW recommended.  OK for OOB to chair and ambulate to bathroom with RW and nursing staff

## 2019-08-01 NOTE — ASSESSMENT & PLAN NOTE
We do not have previous lab work to which compared to chronicity of her renal failure but it is presumed to be acute in chronicity.    Probably secondary to sepsis and obstruction.  Improved with IVF's  resolved

## 2019-08-01 NOTE — SUBJECTIVE & OBJECTIVE
Interval History: feeling better.  Complains of headache    Review of Systems   Constitutional: Negative.    HENT: Negative.    Eyes: Negative.    Respiratory: Negative for cough, chest tightness and shortness of breath.    Cardiovascular: Negative for chest pain.   Gastrointestinal: Negative.  Negative for constipation, diarrhea and nausea.   Musculoskeletal: Negative.    Neurological: Negative.    Psychiatric/Behavioral: Negative.      Objective:     Temp:  [98.1 °F (36.7 °C)-98.6 °F (37 °C)] 98.3 °F (36.8 °C)  Pulse:  [89-99] 99  Resp:  [18-20] 19  SpO2:  [93 %-100 %] 96 %  BP: (120-155)/(58-76) 148/70     Body mass index is 34.12 kg/m².           Drains     Drain                 Urethral Catheter 07/28/19 1045 Straight-tip 16 Fr. 3 days                Physical Exam   Nursing note and vitals reviewed.  Constitutional: She is oriented to person, place, and time. She appears well-developed.   HENT:   Head: Normocephalic.   Eyes: Conjunctivae are normal.   Neck: Normal range of motion. No tracheal deviation present. No thyromegaly present.   Cardiovascular: Normal rate, normal heart sounds and normal pulses.    Pulmonary/Chest: Effort normal and breath sounds normal. No respiratory distress. She has no wheezes.   Abdominal: Soft. She exhibits no distension and no mass. There is no hepatosplenomegaly. There is no tenderness. There is no rebound, no guarding and no CVA tenderness. No hernia.   Genitourinary:   Genitourinary Comments: Sosa with pink tinged urine   Musculoskeletal: Normal range of motion. She exhibits no edema or tenderness.   Lymphadenopathy:     She has no cervical adenopathy.   Neurological: She is alert and oriented to person, place, and time.   Skin: Skin is warm and dry. No rash noted. No erythema.     Psychiatric: She has a normal mood and affect. Her behavior is normal. Judgment and thought content normal.       Significant Labs:    BMP:  Recent Labs   Lab 07/30/19  0203 07/31/19  4993  08/01/19  0441    140 137   K 3.2* 3.7 3.4*    101 96   CO2 29 27 31*   BUN 24* 17 17   CREATININE 1.4 1.2 1.0   CALCIUM 8.0* 8.6* 9.2       CBC:   Recent Labs   Lab 07/30/19  0203 07/31/19  0434 08/01/19  0441   WBC 8.59 8.47 5.56   HGB 8.3* 7.8* 8.3*   HCT 25.4* 25.2* 26.3*   PLT 81* 70* 85*       Blood Culture:   Recent Labs   Lab 07/27/19  1935 07/29/19  1013 07/29/19  1042   LABBLOO Gram stain aer bottle: Gram negative rods  Gram stain nilsa bottle: Gram negative rods  Results called to and read back by: ICU BRENNEN ROGER 07/28/2019  07:40  PROTEUS MIRABILIS  For susceptibility see order #[id number]  * No Growth to date  No Growth to date  No Growth to date No Growth to date  No Growth to date  No Growth to date     Urine Culture:   Recent Labs   Lab 07/28/19  0110   LABURIN PROTEUS MIRABILIS  10,000 - 49,999 cfu/ml  *       Significant Imaging:

## 2019-08-01 NOTE — PLAN OF CARE
08/01/19 1105   Post-Acute Status   Post-Acute Authorization Home Health/Hospice   Home Health/Hospice Status Referrals Sent   Discussed HH with patient and  who agreed to first available in their area.  TN sent referral via [facility] to [facility] (formerly [facility]).  Awaiting response.     1220:  TN called [facility], spoke with Susan who stated that pt will be accepted for services and will be seen tomorrow.

## 2019-08-01 NOTE — ASSESSMENT & PLAN NOTE
2/2 R ureteral stone  S/p R ureteral stent placement by Dr Mendez 7/28/19    D/C Sheila Bashir to d/c home from a  standpoint

## 2019-08-01 NOTE — DISCHARGE SUMMARY
[facility] Hospital Medicine  Discharge Summary      Patient Name: Dasia Abreu  MRN: [id number]  Admission Date: 7/27/2019  Hospital Length of Stay: 5 days  Discharge Date and Time:  08/01/2019 10:41 AM  Attending Physician: Miguelito Enriquez MD   Discharging Provider: Miguelito Enriquez MD  Primary Care Provider: Primary Doctor No      HPI:   Ms. Dasia Abreu is a 68 y.o. female from [place] with essential hypertension, type 2 diabetes mellitus (HbA1c unknown), CAD, and anemia chronic disease who presents to [facility] ED with complaints of diarrhea starting five days ago.  Her elderly mother lives with her in [place] and was driven into town five days ago to attend an appointment with her doctors.  Starting that they she started to watery stools that had resolved by the 2nd day.  Her friend recently bought a new car and she and her group of friends took the car to the [facility].  She started to feel very tired and lethargic and had chills once she arrived home.  She also complained of lower abdominal pain that started that day that was associated with urinary frequency; she denies any dysuria, hematuria, nor any urinary urgency.  Abdominal pain was nonradiating and was 8/10 in severity at its worst.  She also denies any chest pain, shortness of breath, palpitations, nor any diaphoresis.  She also reports right shoulder/right lower neck pain starting around the same time and started have a headache starting yesterday.  She denies any blurry vision.  She also denies any rashes nor joint pain. She has otherwise been in her usual state of health.    Procedure(s) (LRB):  Left heart cath, radial  (Left)  Angioplasty, Coronary Artery, With Stent Insertion (N/A)  Stent, Drug Eluting, Single Vessel, Coronary      Hospital Course:   69 y/o female presented with septic shock secondary to obstructive pyelonephritis with ureteral stone and Proteus bacteremia.  Admitted to ICU on Levophed.  Urology  consulted.  Patient underwent cysto with stent placement on 7/28.  Patient also noted to have elevated troponin, consistent with NSTEMI.  Cardiology consulted.  Also presented with ARF.  Patient unable to get extubated post procedure and Pulmonary consulted.  Initially started on Vanc and Zosyn.  Vanc stopped.  She was started on DAPT on 7/30/2019.  [facility] 7/31/2019. Culprit vessel was proximal to mid OM1 with severe 90% stenosis.  Successful PCI of OM1 with drug-eluting stent x1.  PT/OT consulted on 7/31 for an eval. Renal function improved.  PT/OT rec: home with H/H.  Urology pulled khoury and signed off. Patient will need follow up with cards and urology in [place] in the next 1-2 weeks. Patient will go  Home on Cipro for 12 days. Activity as tolerated. Diet- low NA ADA 1800 karol diet.  Follow up with PCP in one week. H/H will be arranged.     Patient told me that she is on a B-blocker and statin as well as DM meds at home. She will resume all.       Consults:   Consults (From admission, onward)        Status Ordering Provider     Inpatient consult to Cardiology  Once     Provider:  Omid Ramirez MD    Completed ANTOINE PALAFOX     [facility]  Once     Provider:  (Not yet assigned)    Completed JOSÉ HI     Inpatient consult to Pulmonology  Once     Provider:  Jono Morales MD    Completed LITA RODRIGUEZ     Inpatient consult to Urology  Once     Provider:  Brittney Plata MD    Completed ANTOINE PALAFOX     IP consult to case management  Once     Provider:  (Not yet assigned)    Acknowledged CYNTHIA MCCORMICK          No new Assessment & Plan notes have been filed under this hospital service since the last note was generated.  Service: Hospital Medicine    Final Active Diagnoses:    Diagnosis Date Noted POA    NSTEMI (non-ST elevated myocardial infarction) [I21.4] 08/01/2019 Unknown    Ureteral calculi [N20.1] 07/29/2019 Yes    Essential hypertension  [I10] 07/28/2019 Yes     Chronic    Type 2 diabetes mellitus [E11.9] 07/28/2019 Yes     Chronic    Coronary artery disease [I25.10] 07/28/2019 Yes     Chronic    Anemia of chronic disease [D63.8] 07/28/2019 Yes     Chronic      Problems Resolved During this Admission:    Diagnosis Date Noted Date Resolved POA    PRINCIPAL PROBLEM:  Severe sepsis with septic shock [A41.9, R65.21] 07/27/2019 08/01/2019 Yes    Thrombocytopenia, unspecified [D69.6] 07/29/2019 08/01/2019 No    Hypokalemia [E87.6] 07/29/2019 08/01/2019 No    Acute renal failure [N17.9] 07/28/2019 08/01/2019 Yes    Obstructive pyelonephritis [N11.1] 07/28/2019 08/01/2019 Yes    Elevated troponin [R74.8] 07/28/2019 08/01/2019 Yes    Acute respiratory failure with hypoxia [J96.01] 07/28/2019 08/01/2019 No    Metabolic acidosis [E87.2] 07/28/2019 07/29/2019 Yes       Discharged Condition: good    Disposition: Home-Health Care [facility]    Follow Up:  Follow-up Information     W Dylan Mendez MD In 1 week.    Specialty:  Urology  Why:  For post-op follow up and to arrange stone treatment  Contact information:  [address]  [address]  [place] [address]  [phone]             Stephani Lofton MD.    Specialties:  Cardiology, INTERVENTIONAL CARDIOLOGY  Contact information:  [address]  [address]  [place] [address]  [phone]                 Patient Instructions:   No discharge procedures on file.    Significant Diagnostic Studies:   Pending Diagnostic Studies:     None         Medications:  Reconciled Home Medications:      Medication List      START taking these medications    aspirin 81 MG Chew  Take 1 tablet (81 mg total) by mouth once daily.  Start taking on:  8/2/2019     ciprofloxacin HCl 500 MG tablet  Commonly known as:  CIPRO  Take 1 tablet (500 mg total) by mouth 2 (two) times daily. for 12 days     clopidogrel 75 mg tablet  Commonly known as:  PLAVIX  Take 1 tablet (75 mg total) by mouth once daily.  Start taking on:  8/2/2019      DULoxetine 60 MG capsule  Commonly known as:  CYMBALTA  Take 1 capsule (60 mg total) by mouth every evening.            Indwelling Lines/Drains at time of discharge:   Lines/Drains/Airways          None          Time spent on the discharge of patient:  > 30 minutes  Patient was seen and examined on the date of discharge and determined to be suitable for discharge.         Miguelito Douglas MD  Department of Hospital Medicine  [facility]

## 2019-08-01 NOTE — PT/OT/SLP EVAL
Physical Therapy Evaluation    Patient Name:  Dasia Abreu   MRN:  [id number]    Recommendations:     Discharge Recommendations:  home health PT   Discharge Equipment Recommendations: walker, rolling   Barriers to discharge: None    Assessment:     Dasia Abreu is a 68 y.o. female admitted with a medical diagnosis of Severe sepsis with septic shock.  She presents with the following impairments/functional limitations:  weakness, impaired functional mobilty, decreased safety awareness, impaired endurance, gait instability, decreased coordination, pain, impaired balance, impaired self care skills .    Rehab Prognosis: Good; patient would benefit from acute skilled PT services to address these deficits and reach maximum level of function.    Recent Surgery: Procedure(s) (LRB):  Left heart cath, radial  (Left)  Angioplasty, Coronary Artery, With Stent Insertion (N/A)  Stent, Drug Eluting, Single Vessel, Coronary 1 Day Post-Op    Plan:     During this hospitalization, patient to be seen 6 x/week to address the identified rehab impairments via gait training, therapeutic activities, therapeutic exercises and progress toward the following goals:    · Plan of Care Expires:  08/08/19    Subjective     Chief Complaint: pain, weakness  Patient/Family Comments/goals: PLOF  Pain/Comfort:  · Pain Rating 1: 6/10  · Location 1: (B hips)  · Pain Addressed 1: Reposition, Distraction, Cessation of Activity, Nurse notified  · Pain Rating Post-Intervention 1: 6/10    Patients cultural, spiritual, Christianity conflicts given the current situation: no    Living Environment:  Pt lives with her spouse in [place] in a H , no concerns  Prior to admission, patients level of function was Independent.  Equipment used at home: shower chair.  DME owned (not currently used): bedside commode.  Upon discharge, patient will have assistance from spouse.    Objective:     Communicated with nsg prior to session.  Patient found up in chair with khoury catheter,  telemetry, oxygen, PICC line  upon PT entry to room.    General Precautions: Standard, fall   Orthopedic Precautions:N/A   Braces: N/A     Exams:  · Gross Motor Coordination:  impaired 2/2 gen weakness  · Postural Exam:  Patient presented with the following abnormalities:    · -       Rounded shoulders  · -       Forward head  · Sensation:    · -       Intact  light/touch B Le's  · Skin Integrity/Edema:      · -       Skin integrity: Visible skin intact  · RLE ROM: WFL  · RLE Strength: WFL  · LLE ROM: WFL  · LLE Strength: WFL    Functional Mobility:  · Transfers:     · Sit to Stand:  minimum assistance with rolling walker  · Gait: 70' with RW and CGA with VC for walker management/safety and increased trunk ext  · Balance: FAir+ sit, Fair stand      Therapeutic Activities and Exercises:   SPO2 on 3L at rest 99%, on RA at rest 96%, on RA with ambulation 93%    AM-PAC 6 CLICK MOBILITY  Total Score:18     Patient left up in chair with all lines intact, call button in reach, nsg notified and spouse present.    GOALS:   Multidisciplinary Problems     Physical Therapy Goals        Problem: Physical Therapy Goal    Goal Priority Disciplines Outcome Goal Variances Interventions   Physical Therapy Goal     PT, PT/OT Ongoing (interventions implemented as appropriate)     Description:  Goals to be met by: 2019     Patient will increase functional independence with mobility by performin. Sit to stand transfer with Supervision  2. Gait  x 100 feet with Supervision with or without AD                       History:     Past Medical History:   Diagnosis Date    Diabetes mellitus     Diverticulitis     Hypertension     Thyroid disease        Past Surgical History:   Procedure Laterality Date    Angioplasty, Coronary Artery, With Stent Insertion N/A 2019    Performed by Stephani Lofton MD at [facility] CATH LAB    BACK SURGERY      BLADDER SUSPENSION      CATARACT EXTRACTION, BILATERAL      CYSTOSCOPY, WITH URETERAL  STENT INSERTION Bilateral 7/28/2019    Performed by PRETTY Mendez MD at [facility] OR    HYSTERECTOMY      Left heart cath, radial  Left 7/31/2019    Performed by Stephani Lofton MD at [facility] CATH LAB    Stent, Drug Eluting, Single Vessel, Coronary  7/31/2019    Performed by Stephani Lofton MD at [facility] CATH LAB       Time Tracking:     PT Received On: 08/01/19  PT Start Time: 0922     PT Stop Time: 0941  PT Total Time (min): 19 min     Billable Minutes: Evaluation 19      Janice Mao, PT  08/01/2019

## 2019-08-02 NOTE — PT/OT/SLP DISCHARGE
Physical Therapy Discharge Summary    Name: Dasia Abreu  MRN: [id number]   Principal Problem: Severe sepsis with septic shock     Patient Discharged from acute Physical Therapy on 2019.  Please refer to prior PT noted date on 2019 for functional status.     Assessment:     Patient was discharged unexpectedly.  Information required to complete an accurate discharge summary is unknown.  Refer to therapy initial evaluation and last progress note for initial and most recent functional status and goal achievement.  Recommendations made may be found in medical record.    Objective:     GOALS:   Multidisciplinary Problems     Physical Therapy Goals     Not on file          Multidisciplinary Problems (Resolved)        Problem: Physical Therapy Goal    Goal Priority Disciplines Outcome Goal Variances Interventions   Physical Therapy Goal   (Resolved)     PT, PT/OT Outcome(s) achieved     Description:  Goals to be met by: 2019     Patient will increase functional independence with mobility by performin. Sit to stand transfer with Supervision  2. Gait  x 100 feet with Supervision with or without AD                       Reasons for Discontinuation of Therapy Services  Transfer to alternate level of care.      Plan:     Patient Discharged to: Home with Home Health Service.    Janice Mao, PT  2019

## 2019-08-03 LAB
BACTERIA BLD CULT: NORMAL
BACTERIA BLD CULT: NORMAL

## 2019-08-06 NOTE — PT/OT/SLP DISCHARGE
Occupational Therapy Discharge Summary    Dasia Abreu  MRN: [id number]   Principal Problem: Severe sepsis with septic shock      Patient Discharged from acute Occupational Therapy on 08/01/19.  Please refer to prior OT notes for functional status.    Assessment:      Patient was discharged unexpectedly.  Information required to complete an accurate discharge summary is unknown.  Refer to therapy initial evaluation and last progress note for initial and most recent functional status and goal achievement.  Recommendations made may be found in medical record.    Objective:     GOALS:   Multidisciplinary Problems     Occupational Therapy Goals     Not on file          Multidisciplinary Problems (Resolved)        Problem: Occupational Therapy Goal    Goal Priority Disciplines Outcome Interventions   Occupational Therapy Goal   (Resolved)     OT, PT/OT Outcome(s) achieved    Description:  Goals to be met by: 08/15/19     Patient will increase functional independence with ADLs by performing:    LE Dressing with Set-up Assistance.  Grooming while standing at sink with Supervision.  Toileting from toilet with Stand-by Assistance for hygiene and clothing management.   Supine to sit with Supervision.  Step transfer with Supervision  Toilet transfer to toilet with Supervision.  Upper extremity exercise program x15 reps per handout, with independence.                      Reasons for Discontinuation of Therapy Services  Transfer to alternate level of care.      Plan:     Patient Discharged to: Home with Home Health Service    Nini Padilla OT  8/6/2019

## 2020-01-16 NOTE — SUBJECTIVE & OBJECTIVE
Interval History: Remains on vent and Levophed.    Review of Systems   Unable to perform ROS: Intubated     Objective:     Vital Signs (Most Recent):  Temp: 98.2 °F (36.8 °C) (07/29/19 1200)  Pulse: 76 (07/29/19 1230)  Resp: 20 (07/29/19 1230)  BP: (!) 97/52 (07/29/19 1230)  SpO2: 100 % (07/29/19 1230) Vital Signs (24h Range):  Temp:  [98 °F (36.7 °C)-98.7 °F (37.1 °C)] 98.2 °F (36.8 °C)  Pulse:  [] 76  Resp:  [20-41] 20  SpO2:  [92 %-100 %] 100 %  BP: ()/(43-93) 97/52  Arterial Line BP: ()/(39-92) 106/47     Weight: 81.9 kg (180 lb 8.9 oz)  Body mass index is 34.12 kg/m².    Intake/Output Summary (Last 24 hours) at 7/29/2019 1253  Last data filed at 7/29/2019 1204  Gross per 24 hour   Intake 4312.95 ml   Output 3145 ml   Net 1167.95 ml      Physical Exam   Constitutional: She appears well-developed.   HENT:   Head: Normocephalic and atraumatic.   Nose: Nose normal.   Mouth/Throat: Oropharynx is clear and moist.   ET tube in place   Eyes: Pupils are equal, round, and reactive to light. EOM are normal.   Neck: Normal range of motion.   Cardiovascular: Normal rate, regular rhythm and normal heart sounds.   Pulmonary/Chest: Breath sounds normal. No respiratory distress. She has no wheezes. She has no rales. She exhibits no tenderness.   Mechanically ventilated   Abdominal: Soft. Bowel sounds are normal. She exhibits no mass. There is no rebound and no guarding.   Genitourinary:   Genitourinary Comments: Sosa in place.  +bloody urine.     Musculoskeletal: She exhibits no edema or tenderness.   Neurological: She is alert. No cranial nerve deficit. Coordination normal.   Follows command.     Skin: No rash noted. No erythema. No pallor.   Psychiatric: She has a normal mood and affect. Her behavior is normal.       Significant Labs:   BMP:   Recent Labs   Lab 07/29/19  1013   *      K 3.1*      CO2 26   BUN 26*   CREATININE 1.6*   CALCIUM 7.8*   MG 1.7     CBC:   Recent Labs   Lab  07/28/19  1153 07/29/19  0528 07/29/19  1013   WBC 21.83* 9.73 9.41   HGB 10.1* 9.0* 8.8*   HCT 32.4* 27.2* 26.6*   * 71* 66*       Significant Imaging: I have reviewed all pertinent imaging results/findings within the past 24 hours.   Apixaban/Eliquis increases your risk for bleeding. Notify your doctor if you experience any of the following side effects: bleeding, coughing or vomiting blood, red or black stool, unexpected pain or swelling, itching or hives, chest pain, chest tightness, trouble breathing, changes in how much or how often you urinate, red or pink urine, numbness or tingling in your feet, or unusual muscle weakness. When Apixaban/Eliquis is taken with other medicines, they can affect how it works. Taking other medications such as aspirin, blood thinners, nonsteroidal anti-inflammatories, and medications that treat depression can increase your risk of bleeding. It is very important to tell your health care provider about all of the other medicines, including over-the-counter medications, herbs, and vitamins you are taking. DO NOT start, stop, or change the dosage of any medicine, including over-the-counter medicines, vitamins, and herbal products without your doctor’s approval. Any products containing aspirin or are nonsteroidal anti-inflammatories lessen the blood’s ability to form clots and add to the effect of Apixaban/Eliquis. Never take aspirin or medicines that contain aspirin without speaking to your doctor.

## 2023-06-16 NOTE — CARE UPDATE
Pt recieved intubated on Servo i ventilator with the following settings;. PRVC/ 450 TV/ 22 RR/ 30% FI02/ +5 Peep.  Alarms are set and functioning, Ambu bag at bedside, Pt without distress RT will continue to monitor and wean as tolerated.        [Care Plan reviewed and provided to patient/caregiver] : Care plan reviewed and provided to patient/caregiver [Understands and communicates without difficulty] : Patient/Caregiver understands and communicates without difficulty

## 2024-12-27 NOTE — PLAN OF CARE
07/28/19 1802   Discharge Assessment   Assessment Type Discharge Planning Assessment   Confirmed/corrected address and phone number on facesheet? Yes   Assessment information obtained from? Medical Record     Unable to conduct assessment with patient-on vent; CM team will follow-up for full assessment   no

## 2025-03-03 NOTE — PT/OT/SLP PROGRESS
Physical Therapy      Patient Name:  Dasia Abreu   MRN:  [id number]    Patient not seen today secondary to Pt underwent heart cath with 4 hours bedrest following.  Will follow-up as time allows.    Janice Mao, PT     [phone]

## (undated) DEVICE — INTRODUCER CATH 6F 11CM

## (undated) DEVICE — CATH URTRL OPEN END STR TIP 5F

## (undated) DEVICE — CATH ARI 4FR

## (undated) DEVICE — MAT QUICK 40X30 FLOOR FLUID LF

## (undated) DEVICE — INFLATOR ADVANTAGE ENCORE 26

## (undated) DEVICE — INTRODUCER CATH 4F 11CM

## (undated) DEVICE — CONTRAST OMNIPAQUE 300 100ML

## (undated) DEVICE — SOL IRR NACL .9% 3000ML

## (undated) DEVICE — CATH TREK RX 2.50MM X 15MM

## (undated) DEVICE — TRAY FOLEY 16FR INFECTION CONT

## (undated) DEVICE — GLOVE SURG BIOGEL LATEX SZ 7.5

## (undated) DEVICE — WIRE GUIDE .035 150CM.

## (undated) DEVICE — BLANKET UPPER BODY 78.7X29.9IN

## (undated) DEVICE — CATH INFINITI 4F JL4 .042X100

## (undated) DEVICE — CATH NC QUANTUM APEX MR2.75X15

## (undated) DEVICE — KIT MANIFOLD LOW PRESS TUBING

## (undated) DEVICE — Device

## (undated) DEVICE — GUIDEWIRE SAFE T J TIP 145X2.5

## (undated) DEVICE — DEVICE PERCLOSE SUT CLSR 6FR

## (undated) DEVICE — PAD RADI FEMORAL

## (undated) DEVICE — SYR ONLY LUER LOCK 20CC

## (undated) DEVICE — SENSOR DUAL FLEX STR 150CM

## (undated) DEVICE — ELECTRODE EDGE SYSTEM RTS

## (undated) DEVICE — KIT INTRODUCER MICROPUNCTR 4F

## (undated) DEVICE — KIT HAND CONTROL HIGH PRESSUR

## (undated) DEVICE — COVER SNAP KAP 26IN

## (undated) DEVICE — GUIDE LAUNCHER 6FR EBU 3.5

## (undated) DEVICE — GOWN B1 X-LG X-LONG

## (undated) DEVICE — VALVE CONTROL COPILOT

## (undated) DEVICE — CATH INFINITI JUDKINS JR4

## (undated) DEVICE — KIT SYR REUSABLE

## (undated) DEVICE — CATH PIGTAIL 4F 8SH MICRO LOOP

## (undated) DEVICE — GUIDEWIRE RUNTHROUGH EF 180CM

## (undated) DEVICE — PACK CATH LAB

## (undated) DEVICE — SUPPORT ULNA NERVE PROTECTOR